# Patient Record
Sex: FEMALE
[De-identification: names, ages, dates, MRNs, and addresses within clinical notes are randomized per-mention and may not be internally consistent; named-entity substitution may affect disease eponyms.]

---

## 2022-07-15 PROBLEM — Z00.00 ENCOUNTER FOR PREVENTIVE HEALTH EXAMINATION: Status: ACTIVE | Noted: 2022-07-15

## 2022-07-21 DIAGNOSIS — Z63.4 DISAPPEARANCE AND DEATH OF FAMILY MEMBER: ICD-10-CM

## 2022-07-21 DIAGNOSIS — Z78.9 OTHER SPECIFIED HEALTH STATUS: ICD-10-CM

## 2022-07-21 DIAGNOSIS — D49.89 NEOPLASM OF UNSPECIFIED BEHAVIOR OF OTHER SPECIFIED SITES: ICD-10-CM

## 2022-07-21 DIAGNOSIS — N20.0 CALCULUS OF KIDNEY: ICD-10-CM

## 2022-07-21 DIAGNOSIS — Z82.49 FAMILY HISTORY OF ISCHEMIC HEART DISEASE AND OTHER DISEASES OF THE CIRCULATORY SYSTEM: ICD-10-CM

## 2022-07-21 SDOH — SOCIAL STABILITY - SOCIAL INSECURITY: DISSAPEARANCE AND DEATH OF FAMILY MEMBER: Z63.4

## 2022-07-22 ENCOUNTER — APPOINTMENT (OUTPATIENT)
Dept: CARDIOLOGY | Facility: CLINIC | Age: 87
End: 2022-07-22

## 2022-07-22 ENCOUNTER — NON-APPOINTMENT (OUTPATIENT)
Age: 87
End: 2022-07-22

## 2022-07-22 VITALS
RESPIRATION RATE: 14 BRPM | SYSTOLIC BLOOD PRESSURE: 124 MMHG | WEIGHT: 126 LBS | OXYGEN SATURATION: 98 % | DIASTOLIC BLOOD PRESSURE: 71 MMHG | HEIGHT: 63 IN | HEART RATE: 80 BPM | BODY MASS INDEX: 22.32 KG/M2

## 2022-07-22 DIAGNOSIS — I44.7 LEFT BUNDLE-BRANCH BLOCK, UNSPECIFIED: ICD-10-CM

## 2022-07-22 DIAGNOSIS — R60.0 LOCALIZED EDEMA: ICD-10-CM

## 2022-07-22 DIAGNOSIS — M19.90 UNSPECIFIED OSTEOARTHRITIS, UNSPECIFIED SITE: ICD-10-CM

## 2022-07-22 DIAGNOSIS — K27.9 PEPTIC ULCER, SITE UNSPECIFIED, UNSPECIFIED AS ACUTE OR CHRONIC, W/OUT HEMORRHAGE OR PERFORATION: ICD-10-CM

## 2022-07-22 DIAGNOSIS — I10 ESSENTIAL (PRIMARY) HYPERTENSION: ICD-10-CM

## 2022-07-22 DIAGNOSIS — E78.5 HYPERLIPIDEMIA, UNSPECIFIED: ICD-10-CM

## 2022-07-22 DIAGNOSIS — E03.9 HYPOTHYROIDISM, UNSPECIFIED: ICD-10-CM

## 2022-07-22 DIAGNOSIS — M54.50 LOW BACK PAIN, UNSPECIFIED: ICD-10-CM

## 2022-07-22 DIAGNOSIS — I11.9 HYPERTENSIVE HEART DISEASE W/OUT HEART FAILURE: ICD-10-CM

## 2022-07-22 PROCEDURE — 99204 OFFICE O/P NEW MOD 45 MIN: CPT | Mod: 25

## 2022-07-22 PROCEDURE — 93000 ELECTROCARDIOGRAM COMPLETE: CPT

## 2022-07-22 RX ORDER — PANTOPRAZOLE 40 MG/1
40 TABLET, DELAYED RELEASE ORAL
Qty: 90 | Refills: 1 | Status: ACTIVE | COMMUNITY
Start: 1900-01-01 | End: 1900-01-01

## 2022-07-22 RX ORDER — PNV NO.95/FERROUS FUM/FOLIC AC 28MG-0.8MG
TABLET ORAL DAILY
Refills: 0 | Status: ACTIVE | COMMUNITY

## 2022-07-22 RX ORDER — TRAMADOL HYDROCHLORIDE 50 MG/1
50 TABLET, COATED ORAL
Qty: 60 | Refills: 1 | Status: ACTIVE | COMMUNITY
Start: 1900-01-01 | End: 1900-01-01

## 2022-07-22 RX ORDER — CHOLECALCIFEROL (VITAMIN D3) 1250 MCG
1.25 MG CAPSULE ORAL
Qty: 8 | Refills: 0 | Status: ACTIVE | COMMUNITY

## 2022-07-22 RX ORDER — ASCORBIC ACID 500 MG
500 TABLET ORAL DAILY
Refills: 0 | Status: ACTIVE | COMMUNITY

## 2022-07-22 RX ORDER — LEVOTHYROXINE SODIUM 88 UG/1
88 TABLET ORAL DAILY
Refills: 0 | Status: ACTIVE | COMMUNITY

## 2022-07-22 RX ORDER — LOSARTAN POTASSIUM 100 MG/1
100 TABLET, FILM COATED ORAL DAILY
Qty: 90 | Refills: 3 | Status: ACTIVE | COMMUNITY

## 2022-07-22 NOTE — REVIEW OF SYSTEMS
[Lower Ext Edema] : lower extremity edema [Joint Pain] : joint pain [Joint Swelling] : joint swelling [Chills] : no chills [Blurry Vision] : no blurred vision [Sore Throat] : no sore throat [SOB] : no shortness of breath [Chest Discomfort] : no chest discomfort [Orthopnea] : no orthopnea [Cough] : no cough [Abdominal Pain] : no abdominal pain [Dysuria] : no dysuria [Rash] : no rash [Dizziness] : no dizziness [Confusion] : no confusion was observed [Easy Bleeding] : no tendency for easy bleeding [de-identified] : mild forgetfulness

## 2022-07-22 NOTE — DISCUSSION/SUMMARY
[FreeTextEntry1] : Patient with above hx \par \par \par bilateral lower extremity edema : possibly due to dependent venous stasis edema vs Norvasc related , doubt CHF , encourage patient to decrease salt intake , elevate feet , pressure stockings , will give low dose lasix for 2 days and observe , will obtain echocardiogram to assess ventricular function ,  blood work \par \par HTN Hypertensive heart disease :  controlled , continue  Losartan HCTZ  norvasc , will obtain echo \par \par LBBB : no prior ekg  without chest pain : will obtain her prior medical records from [facility] [id number]  phone \par \par Hypothyroidism  : will obtain her blood work  continue current dose .\par \par Chronic back pain : on tramadol, given prescription \par \par \par follow up after 6 weeks

## 2022-07-22 NOTE — PHYSICAL EXAM
[Well Developed] : well developed [Normal Conjunctiva] : normal conjunctiva [Normal Venous Pressure] : normal venous pressure [Normal S1, S2] : normal S1, S2 [No Murmur] : no murmur [Clear Lung Fields] : clear lung fields [Good Air Entry] : good air entry [No Respiratory Distress] : no respiratory distress  [Soft] : abdomen soft [Non Tender] : non-tender [Normal Bowel Sounds] : normal bowel sounds [Abnormal Gait] : abnormal gait [Normal Radial B/L] : normal radial B/L [Edema ___] : edema [unfilled] [Memory Deficit] : memory deficit [Alert and Oriented] : alert and oriented [de-identified] : kyphosis

## 2022-07-22 NOTE — HISTORY OF PRESENT ILLNESS
[FreeTextEntry1] : 89 year old female with Hx of hypertension , hypothyroid , chronic back pain , who came for cardiac evaluation as she moved from [place]  living with her niece , patient does have chronic lower extremity edema for many months , sits most of the time , felt like worsening for last one year , as she is less active , the swelling is less in the morning when she gets out of bed , gets worse by evening , denies any shortness of breath , denies any chest pain .\par \par Patient blood pressure is controlled , patient can not walk much due to back pain ,  patient did have hx of peptic ulcer disease taking Protonix ,  \par \par

## 2022-08-15 ENCOUNTER — RX CHANGE (OUTPATIENT)
Age: 87
End: 2022-08-15

## 2022-08-21 ENCOUNTER — RX RENEWAL (OUTPATIENT)
Age: 87
End: 2022-08-21

## 2022-09-29 ENCOUNTER — RX RENEWAL (OUTPATIENT)
Age: 87
End: 2022-09-29

## 2022-10-12 NOTE — DISCUSSION/SUMMARY
[FreeTextEntry1] : Patient with above hx \par \par \par bilateral lower extremity edema : possibly due to dependent venous stasis edema vs Norvasc related , doubt CHF , encourage patient to decrease salt intake , elevate feet , pressure stockings , will give low dose lasix for 2 days and observe , will obtain echocardiogram to assess ventricular function ,  blood work \par \par HTN Hypertensive heart disease :  controlled , continue  Losartan HCTZ  norvasc , will obtain echo \par \par LBBB : no prior ekg  without chest pain : will obtain her prior medical records from [facility] [id number]  phone \par \par Hypothyroidism  : will obtain her blood work  continue current dose .\par \par Chronic back pain : on tramadol, given prescription \par \par \par follow up after 6 weeks

## 2022-10-12 NOTE — REVIEW OF SYSTEMS
[Chills] : no chills [Blurry Vision] : no blurred vision [Sore Throat] : no sore throat [SOB] : no shortness of breath [Chest Discomfort] : no chest discomfort [Lower Ext Edema] : lower extremity edema [Orthopnea] : no orthopnea [Cough] : no cough [Abdominal Pain] : no abdominal pain [Dysuria] : no dysuria [Joint Pain] : joint pain [Joint Swelling] : joint swelling [Rash] : no rash [Dizziness] : no dizziness [Confusion] : no confusion was observed [Easy Bleeding] : no tendency for easy bleeding [de-identified] : mild forgetfulness

## 2022-10-12 NOTE — PHYSICAL EXAM
[Well Developed] : well developed [Normal Conjunctiva] : normal conjunctiva [Normal Venous Pressure] : normal venous pressure [Normal S1, S2] : normal S1, S2 [No Murmur] : no murmur [Clear Lung Fields] : clear lung fields [Good Air Entry] : good air entry [No Respiratory Distress] : no respiratory distress  [Soft] : abdomen soft [Non Tender] : non-tender [Normal Bowel Sounds] : normal bowel sounds [Abnormal Gait] : abnormal gait [Normal Radial B/L] : normal radial B/L [Edema ___] : edema [unfilled] [Memory Deficit] : memory deficit [Alert and Oriented] : alert and oriented [de-identified] : kyphosis

## 2022-10-14 ENCOUNTER — APPOINTMENT (OUTPATIENT)
Dept: CARDIOLOGY | Facility: CLINIC | Age: 87
End: 2022-10-14

## 2023-01-18 RX ORDER — HYDROCHLOROTHIAZIDE 12.5 MG/1
12.5 CAPSULE ORAL
Qty: 90 | Refills: 0 | Status: ACTIVE | COMMUNITY
Start: 1900-01-01 | End: 1900-01-01

## 2023-02-07 ENCOUNTER — RX RENEWAL (OUTPATIENT)
Age: 88
End: 2023-02-07

## 2023-04-09 ENCOUNTER — INPATIENT (INPATIENT)
Facility: HOSPITAL | Age: 88
LOS: 4 days | Discharge: HOME | DRG: 690 | End: 2023-04-14
Attending: STUDENT IN AN ORGANIZED HEALTH CARE EDUCATION/TRAINING PROGRAM | Admitting: STUDENT IN AN ORGANIZED HEALTH CARE EDUCATION/TRAINING PROGRAM
Payer: MEDICARE

## 2023-04-09 VITALS
OXYGEN SATURATION: 95 % | HEART RATE: 76 BPM | SYSTOLIC BLOOD PRESSURE: 111 MMHG | DIASTOLIC BLOOD PRESSURE: 66 MMHG | HEIGHT: 61 IN | RESPIRATION RATE: 22 BRPM | WEIGHT: 117.95 LBS | TEMPERATURE: 98 F

## 2023-04-09 DIAGNOSIS — N39.0 URINARY TRACT INFECTION, SITE NOT SPECIFIED: ICD-10-CM

## 2023-04-09 LAB
ALBUMIN SERPL ELPH-MCNC: 2.9 G/DL — LOW (ref 3.3–5)
ALP SERPL-CCNC: 74 U/L — SIGNIFICANT CHANGE UP (ref 30–120)
ALT FLD-CCNC: 16 U/L DA — SIGNIFICANT CHANGE UP (ref 10–60)
ANION GAP SERPL CALC-SCNC: 9 MMOL/L — SIGNIFICANT CHANGE UP (ref 5–17)
APPEARANCE UR: ABNORMAL
AST SERPL-CCNC: 26 U/L — SIGNIFICANT CHANGE UP (ref 10–40)
BACTERIA # UR AUTO: ABNORMAL
BASOPHILS # BLD AUTO: 0.04 K/UL — SIGNIFICANT CHANGE UP (ref 0–0.2)
BASOPHILS NFR BLD AUTO: 0.4 % — SIGNIFICANT CHANGE UP (ref 0–2)
BILIRUB SERPL-MCNC: 0.3 MG/DL — SIGNIFICANT CHANGE UP (ref 0.2–1.2)
BILIRUB UR-MCNC: NEGATIVE — SIGNIFICANT CHANGE UP
BUN SERPL-MCNC: 26 MG/DL — HIGH (ref 7–23)
CALCIUM SERPL-MCNC: 8.7 MG/DL — SIGNIFICANT CHANGE UP (ref 8.4–10.5)
CHLORIDE SERPL-SCNC: 96 MMOL/L — SIGNIFICANT CHANGE UP (ref 96–108)
CK SERPL-CCNC: 214 U/L — HIGH (ref 26–192)
CO2 SERPL-SCNC: 29 MMOL/L — SIGNIFICANT CHANGE UP (ref 22–31)
COLOR SPEC: YELLOW — SIGNIFICANT CHANGE UP
CREAT SERPL-MCNC: 2.02 MG/DL — HIGH (ref 0.5–1.3)
DIFF PNL FLD: ABNORMAL
EGFR: 22 ML/MIN/1.73M2 — LOW
EOSINOPHIL # BLD AUTO: 0.03 K/UL — SIGNIFICANT CHANGE UP (ref 0–0.5)
EOSINOPHIL NFR BLD AUTO: 0.3 % — SIGNIFICANT CHANGE UP (ref 0–6)
EPI CELLS # UR: SIGNIFICANT CHANGE UP
ERYTHROCYTE [SEDIMENTATION RATE] IN BLOOD: 69 MM/HR — HIGH (ref 0–20)
GLUCOSE SERPL-MCNC: 129 MG/DL — HIGH (ref 70–99)
GLUCOSE UR QL: NEGATIVE MG/DL — SIGNIFICANT CHANGE UP
HCT VFR BLD CALC: 32.3 % — LOW (ref 34.5–45)
HGB BLD-MCNC: 10.5 G/DL — LOW (ref 11.5–15.5)
IMM GRANULOCYTES NFR BLD AUTO: 0.4 % — SIGNIFICANT CHANGE UP (ref 0–0.9)
KETONES UR-MCNC: NEGATIVE — SIGNIFICANT CHANGE UP
LEUKOCYTE ESTERASE UR-ACNC: ABNORMAL
LYMPHOCYTES # BLD AUTO: 1.1 K/UL — SIGNIFICANT CHANGE UP (ref 1–3.3)
LYMPHOCYTES # BLD AUTO: 11.9 % — LOW (ref 13–44)
MCHC RBC-ENTMCNC: 26.9 PG — LOW (ref 27–34)
MCHC RBC-ENTMCNC: 32.5 GM/DL — SIGNIFICANT CHANGE UP (ref 32–36)
MCV RBC AUTO: 82.6 FL — SIGNIFICANT CHANGE UP (ref 80–100)
MONOCYTES # BLD AUTO: 0.73 K/UL — SIGNIFICANT CHANGE UP (ref 0–0.9)
MONOCYTES NFR BLD AUTO: 7.9 % — SIGNIFICANT CHANGE UP (ref 2–14)
NEUTROPHILS # BLD AUTO: 7.3 K/UL — SIGNIFICANT CHANGE UP (ref 1.8–7.4)
NEUTROPHILS NFR BLD AUTO: 79.1 % — HIGH (ref 43–77)
NITRITE UR-MCNC: POSITIVE
NRBC # BLD: 0 /100 WBCS — SIGNIFICANT CHANGE UP (ref 0–0)
PH UR: 7 — SIGNIFICANT CHANGE UP (ref 5–8)
PLATELET # BLD AUTO: 443 K/UL — HIGH (ref 150–400)
POTASSIUM SERPL-MCNC: 3.4 MMOL/L — LOW (ref 3.5–5.3)
POTASSIUM SERPL-SCNC: 3.4 MMOL/L — LOW (ref 3.5–5.3)
PROT SERPL-MCNC: 7.4 G/DL — SIGNIFICANT CHANGE UP (ref 6–8.3)
PROT UR-MCNC: 30 MG/DL
RBC # BLD: 3.91 M/UL — SIGNIFICANT CHANGE UP (ref 3.8–5.2)
RBC # FLD: 15.9 % — HIGH (ref 10.3–14.5)
RBC CASTS # UR COMP ASSIST: SIGNIFICANT CHANGE UP /HPF (ref 0–4)
SARS-COV-2 RNA SPEC QL NAA+PROBE: SIGNIFICANT CHANGE UP
SODIUM SERPL-SCNC: 134 MMOL/L — LOW (ref 135–145)
SP GR SPEC: 1.01 — SIGNIFICANT CHANGE UP (ref 1.01–1.02)
TROPONIN I, HIGH SENSITIVITY RESULT: 10.4 NG/L — SIGNIFICANT CHANGE UP
UROBILINOGEN FLD QL: NEGATIVE MG/DL — SIGNIFICANT CHANGE UP
WBC # BLD: 9.24 K/UL — SIGNIFICANT CHANGE UP (ref 3.8–10.5)
WBC # FLD AUTO: 9.24 K/UL — SIGNIFICANT CHANGE UP (ref 3.8–10.5)
WBC UR QL: >50

## 2023-04-09 PROCEDURE — 70450 CT HEAD/BRAIN W/O DYE: CPT | Mod: 26,MA

## 2023-04-09 PROCEDURE — 73562 X-RAY EXAM OF KNEE 3: CPT | Mod: 26,LT

## 2023-04-09 PROCEDURE — 72170 X-RAY EXAM OF PELVIS: CPT | Mod: 26

## 2023-04-09 PROCEDURE — 99285 EMERGENCY DEPT VISIT HI MDM: CPT

## 2023-04-09 PROCEDURE — 71045 X-RAY EXAM CHEST 1 VIEW: CPT | Mod: 26

## 2023-04-09 PROCEDURE — 93010 ELECTROCARDIOGRAM REPORT: CPT

## 2023-04-09 PROCEDURE — 99222 1ST HOSP IP/OBS MODERATE 55: CPT

## 2023-04-09 PROCEDURE — 72100 X-RAY EXAM L-S SPINE 2/3 VWS: CPT | Mod: 26

## 2023-04-09 PROCEDURE — 73110 X-RAY EXAM OF WRIST: CPT | Mod: 26,LT

## 2023-04-09 PROCEDURE — 72070 X-RAY EXAM THORAC SPINE 2VWS: CPT | Mod: 26

## 2023-04-09 RX ORDER — SODIUM CHLORIDE 9 MG/ML
1000 INJECTION INTRAMUSCULAR; INTRAVENOUS; SUBCUTANEOUS
Refills: 0 | Status: DISCONTINUED | OUTPATIENT
Start: 2023-04-09 | End: 2023-04-09

## 2023-04-09 RX ORDER — LANOLIN ALCOHOL/MO/W.PET/CERES
3 CREAM (GRAM) TOPICAL AT BEDTIME
Refills: 0 | Status: DISCONTINUED | OUTPATIENT
Start: 2023-04-09 | End: 2023-04-14

## 2023-04-09 RX ORDER — SODIUM CHLORIDE 9 MG/ML
500 INJECTION INTRAMUSCULAR; INTRAVENOUS; SUBCUTANEOUS ONCE
Refills: 0 | Status: COMPLETED | OUTPATIENT
Start: 2023-04-09 | End: 2023-04-09

## 2023-04-09 RX ORDER — CEFTRIAXONE 500 MG/1
1000 INJECTION, POWDER, FOR SOLUTION INTRAMUSCULAR; INTRAVENOUS ONCE
Refills: 0 | Status: COMPLETED | OUTPATIENT
Start: 2023-04-09 | End: 2023-04-09

## 2023-04-09 RX ORDER — SODIUM CHLORIDE 9 MG/ML
1000 INJECTION INTRAMUSCULAR; INTRAVENOUS; SUBCUTANEOUS
Refills: 0 | Status: DISCONTINUED | OUTPATIENT
Start: 2023-04-09 | End: 2023-04-11

## 2023-04-09 RX ORDER — CEFTRIAXONE 500 MG/1
1000 INJECTION, POWDER, FOR SOLUTION INTRAMUSCULAR; INTRAVENOUS EVERY 24 HOURS
Refills: 0 | Status: DISCONTINUED | OUTPATIENT
Start: 2023-04-10 | End: 2023-04-12

## 2023-04-09 RX ORDER — ENOXAPARIN SODIUM 100 MG/ML
30 INJECTION SUBCUTANEOUS EVERY 24 HOURS
Refills: 0 | Status: DISCONTINUED | OUTPATIENT
Start: 2023-04-09 | End: 2023-04-14

## 2023-04-09 RX ORDER — ACETAMINOPHEN 500 MG
650 TABLET ORAL EVERY 6 HOURS
Refills: 0 | Status: DISCONTINUED | OUTPATIENT
Start: 2023-04-09 | End: 2023-04-14

## 2023-04-09 RX ADMIN — ENOXAPARIN SODIUM 30 MILLIGRAM(S): 100 INJECTION SUBCUTANEOUS at 11:52

## 2023-04-09 RX ADMIN — SODIUM CHLORIDE 75 MILLILITER(S): 9 INJECTION INTRAMUSCULAR; INTRAVENOUS; SUBCUTANEOUS at 11:52

## 2023-04-09 RX ADMIN — CEFTRIAXONE 100 MILLIGRAM(S): 500 INJECTION, POWDER, FOR SOLUTION INTRAMUSCULAR; INTRAVENOUS at 09:22

## 2023-04-09 RX ADMIN — SODIUM CHLORIDE 500 MILLILITER(S): 9 INJECTION INTRAMUSCULAR; INTRAVENOUS; SUBCUTANEOUS at 10:22

## 2023-04-09 RX ADMIN — SODIUM CHLORIDE 500 MILLILITER(S): 9 INJECTION INTRAMUSCULAR; INTRAVENOUS; SUBCUTANEOUS at 09:22

## 2023-04-09 RX ADMIN — CEFTRIAXONE 1000 MILLIGRAM(S): 500 INJECTION, POWDER, FOR SOLUTION INTRAMUSCULAR; INTRAVENOUS at 10:00

## 2023-04-09 NOTE — CONSULT NOTE ADULT - ASSESSMENT
89y old  Female with h/o HTN presents from assisted living Came to ED s/p Fall.  No C/o Lightheadedness Vertigo.  C/o left wrist pain and also left hip area pain.   Left wrist splint in place.  Multiple X rays are done - Reports to follow.  CT Head - No acute pathology.  No signs of CVA.  Cr - 2.02  No signs of CVA.  F/up x rays reports.  Ortho eval if any Fx  Pain meds.  PT when cleared by Ortho  Fall/safety precautions.   D/w Dr. Godinez  Would continue to follow.

## 2023-04-09 NOTE — CONSULT NOTE ADULT - ASSESSMENT
The patient is a 89 year old female with a history of HTN, dementia who presents with a fall.    Plan:  - ECG with LBBB; no prior for comparison  - Cardiac enzymes negative  - Elevated creatinine - no prior for comparison  - Continue IV fluids  - Hold furosemide  - Hold amlodipine - BP on low side  - IV antibiotics for possible UTI

## 2023-04-09 NOTE — CONSULT NOTE ADULT - SUBJECTIVE AND OBJECTIVE BOX
History of Present Illness:    Past Medical/Surgical History:    Medications:    Family History: Non-contributory family history of premature cardiovascular atherosclerotic disease    Social History: No tobacco, alcohol or drug use    Review of Systems:  General: No fevers, chills, weight gain  Skin: No rashes, color changes  Cardiovascular: No chest pain, orthopnea  Respiratory: No shortness of breath, cough  Gastrointestinal: No nausea, abdominal pain  Genitourinary: No incontinence, pain with urination  Musculoskeletal: No pain, swelling, decreased range of motion  Neurological: No headache, weakness  Psychiatric: No depression, anxiety  Endocrine: No weight gain, increased thirst  All other systems are comprehensively negative.    Physical Exam:  Vitals:        Vital Signs Last 24 Hrs  T(C): 36.1 (09 Apr 2023 07:41), Max: 36.4 (09 Apr 2023 06:35)  T(F): 97 (09 Apr 2023 07:41), Max: 97.5 (09 Apr 2023 06:35)  HR: 77 (09 Apr 2023 07:41) (76 - 77)  BP: 101/60 (09 Apr 2023 07:41) (101/60 - 111/66)  BP(mean): --  RR: 18 (09 Apr 2023 07:41) (18 - 22)  SpO2: 95% (09 Apr 2023 07:41) (95% - 95%)    Parameters below as of 09 Apr 2023 07:41  Patient On (Oxygen Delivery Method): room air      General: NAD  HEENT: MMM  Neck: No JVD, no carotid bruit  Lungs: CTAB  CV: RRR, nl S1/S2, no M/R/G  Abdomen: S/NT/ND, +BS  Extremities: No LE edema, no cyanosis  Neuro: AAOx3, non-focal  Skin: No rash    Labs:                        10.5   9.24  )-----------( 443      ( 09 Apr 2023 07:34 )             32.3     04-09    134<L>  |  96  |  26<H>  ----------------------------<  129<H>  3.4<L>   |  29  |  2.02<H>    Ca    8.7      09 Apr 2023 07:34    TPro  7.4  /  Alb  2.9<L>  /  TBili  0.3  /  DBili  x   /  AST  26  /  ALT  16  /  AlkPhos  74  04-09    CARDIAC MARKERS ( 09 Apr 2023 07:38 )  x     / x     / 214 U/L / x     / x              ECG/Telemetry: NSR, LBBB     History of Present Illness: The patient is a 89 year old female with a history of HTN, dementia who presents with a fall. The patient is a poor historian. She notes left arm pain but unable to elaborate on the fall. No chest pain, shortness of breath. She reportedly was feeling dizzy previously.    Past Medical/Surgical History:  HTN, dementia    Medications:  Home Medications:  amLODIPine 5 mg oral tablet: 1 orally 2 times a day (09 Apr 2023 09:25)  Colace 100 mg oral capsule: 1 orally once a day (09 Apr 2023 09:25)  furosemide 40 mg oral tablet: 1 orally once a day (09 Apr 2023 09:25)  Vitamin D3 50 mcg (2000 intl units) oral tablet: 1 orally once a day (09 Apr 2023 09:25)      Family History: Non-contributory family history of premature cardiovascular atherosclerotic disease    Social History: No tobacco, alcohol or drug use    Review of Systems:  General: No fevers, chills, weight gain  Skin: No rashes, color changes  Cardiovascular: No chest pain, orthopnea  Respiratory: No shortness of breath, cough  Gastrointestinal: No nausea, abdominal pain  Genitourinary: No incontinence, pain with urination  Musculoskeletal: No pain, swelling, decreased range of motion  Neurological: No headache, weakness  Psychiatric: No depression, anxiety  Endocrine: No weight gain, increased thirst  All other systems are comprehensively negative.    Physical Exam:  Vitals:        Vital Signs Last 24 Hrs  T(C): 36.1 (09 Apr 2023 07:41), Max: 36.4 (09 Apr 2023 06:35)  T(F): 97 (09 Apr 2023 07:41), Max: 97.5 (09 Apr 2023 06:35)  HR: 77 (09 Apr 2023 07:41) (76 - 77)  BP: 101/60 (09 Apr 2023 07:41) (101/60 - 111/66)  BP(mean): --  RR: 18 (09 Apr 2023 07:41) (18 - 22)  SpO2: 95% (09 Apr 2023 07:41) (95% - 95%)  Parameters below as of 09 Apr 2023 07:41  Patient On (Oxygen Delivery Method): room air  General: NAD  HEENT: MMM  Neck: No JVD, no carotid bruit  Lungs: CTAB  CV: RRR, nl S1/S2, no M/R/G  Abdomen: S/NT/ND, +BS  Extremities: No LE edema, no cyanosis  Neuro: AAOx3, non-focal  Skin: No rash    Labs:                        10.5   9.24  )-----------( 443      ( 09 Apr 2023 07:34 )             32.3     04-09    134<L>  |  96  |  26<H>  ----------------------------<  129<H>  3.4<L>   |  29  |  2.02<H>    Ca    8.7      09 Apr 2023 07:34    TPro  7.4  /  Alb  2.9<L>  /  TBili  0.3  /  DBili  x   /  AST  26  /  ALT  16  /  AlkPhos  74  04-09    CARDIAC MARKERS ( 09 Apr 2023 07:38 )  x     / x     / 214 U/L / x     / x              ECG/Telemetry: NSR, LBBB

## 2023-04-09 NOTE — CONSULT NOTE ADULT - SUBJECTIVE AND OBJECTIVE BOX
Patient is a 89y old  Female who presents with a chief complaint of Dizziness  Fall (2023 10:51)    HPI:  89 yo F with PMH HTN, OA presenting from [facility] with a fall. Patient is a limited historian due to mental status, collateral from chart review. Called patients niece but was not able to provide additional info. Patient BIBEMS from [facility] for fall. Patient not sure how she fell but currently complains of pain in her left wrist and knee. She currently denies any other sx. Workup in the ED suggestive of UTI. X-rays of wrist, knees, and spine pending.   (2023 10:52)      PAST MEDICAL HISTORY:  HTN (hypertension)        PAST SURGICAL HISTORY:  No significant past surgical history        FAMILY HISTORY:  No pertinent family history in first degree relatives        SOCIAL HISTORY:    Allergies    No Known Allergies    Intolerances      Home Medications:  amLODIPine 5 mg oral tablet: 1 orally 2 times a day (2023 09:25)  Colace 100 mg oral capsule: 1 orally once a day (2023 09:25)  furosemide 40 mg oral tablet: 1 orally once a day (2023 09:25)  Vitamin D3 50 mcg (2000 intl units) oral tablet: 1 orally once a day (2023 09:25)    MEDICATIONS  (STANDING):  enoxaparin Injectable 30 milliGRAM(s) SubCutaneous every 24 hours  sodium chloride 0.9%. 1000 milliLiter(s) (50 mL/Hr) IV Continuous <Continuous>    MEDICATIONS  (PRN):  acetaminophen     Tablet .. 650 milliGRAM(s) Oral every 6 hours PRN Temp greater or equal to 38C (100.4F), Mild Pain (1 - 3)  melatonin 3 milliGRAM(s) Oral at bedtime PRN Insomnia      REVIEW OF SYSTEMS:  General:   Respiratory: No cough, SOB  Cardiovascular: No CP or Palpitations	  Gastrointestinal: No nausea, Vomiting. No diarrhea  Genitourinary: No urinary complaints	  Musculoskeletal: No leg swelling, No new rash or lesions	  Neurological: 	  all other systems negative    T(F): 97 (23 @ 07:41), Max: 97.5 (23 @ 06:35)  HR: 77 (23 @ 07:41) (76 - 77)  BP: 101/60 (23 @ 07:41) (101/60 - 111/66)  RR: 18 (23 @ 07:41) (18 - 22)  SpO2: 95% (23 @ 07:41) (95% - 95%)  Wt(kg): --    PHYSICAL EXAM:  General: NAD  Respiratory: b/l air entry  Cardiovascular: S1 S2  Gastrointestinal: soft  Extremities: edema            134<L>  |  96  |  26<H>  ----------------------------<  129<H>  3.4<L>   |  29  |  2.02<H>    Ca    8.7      2023 07:34    TPro  7.4  /  Alb  2.9<L>  /  TBili  0.3  /  DBili  x   /  AST  26  /  ALT  16  /  AlkPhos  74                            10.5   9.24  )-----------( 443      ( 2023 07:34 )             32.3       Hematocrit: 32.3 % ( @ 07:34)  Hemoglobin: 10.5 g/dL ( @ 07:34)  Calcium, Total Serum: 8.7 mg/dL ( @ 07:34)  Potassium, Serum: 3.4 mmol/L ( @ 07:34)      Creatinine, Serum: 2.02 ( @ 07:34)      Urinalysis Basic - ( 2023 07:34 )    Color: Yellow / Appearance: Slightly Turbid / S.010 / pH: x  Gluc: x / Ketone: Negative  / Bili: Negative / Urobili: Negative mg/dL   Blood: x / Protein: 30 mg/dL / Nitrite: Positive   Leuk Esterase: Moderate / RBC: 0-2 /HPF / WBC >50   Sq Epi: x / Non Sq Epi: x / Bacteria: Moderate      LIVER FUNCTIONS - ( 2023 07:34 )  Alb: 2.9 g/dL / Pro: 7.4 g/dL / ALK PHOS: 74 U/L / ALT: 16 U/L DA / AST: 26 U/L / GGT: x           CARDIAC MARKERS ( 2023 07:38 )  x     / x     / 214 U/L / x     / x          Creatine Kinase, Serum: 214 U/L (23 @ 07:38)          I&O's Detail           Patient is a 89y old  Female who presents with a chief complaint of Dizziness  Fall (2023 10:51)    HPI:  89 yo F with PMH HTN, OA presenting from [facility] with a fall. Patient is a limited historian due to mental status, collateral from chart review. Called patients niece but was not able to provide additional info. Patient BIBEMS from [facility] for fall. Patient not sure how she fell but currently complains of pain in her left wrist and knee. She currently denies any other sx. Workup in the ED suggestive of UTI. X-rays of wrist, knees, and spine pending.   (2023 10:52)    Renal consult called for SAM. History obtained from chart.       PAST MEDICAL HISTORY:  HTN (hypertension)        PAST SURGICAL HISTORY:  No significant past surgical history        FAMILY HISTORY:  No pertinent family history in first degree relatives        SOCIAL HISTORY: No smoking or alcohol use     Allergies    No Known Allergies    Intolerances      Home Medications:  amLODIPine 5 mg oral tablet: 1 orally 2 times a day (2023 09:25)  Colace 100 mg oral capsule: 1 orally once a day (2023 09:25)  furosemide 40 mg oral tablet: 1 orally once a day (2023 09:25)  Vitamin D3 50 mcg (2000 intl units) oral tablet: 1 orally once a day (2023 09:25)    MEDICATIONS  (STANDING):  enoxaparin Injectable 30 milliGRAM(s) SubCutaneous every 24 hours  sodium chloride 0.9%. 1000 milliLiter(s) (50 mL/Hr) IV Continuous <Continuous>    MEDICATIONS  (PRN):  acetaminophen     Tablet .. 650 milliGRAM(s) Oral every 6 hours PRN Temp greater or equal to 38C (100.4F), Mild Pain (1 - 3)  melatonin 3 milliGRAM(s) Oral at bedtime PRN Insomnia      REVIEW OF SYSTEMS:  General: no distress  Respiratory: No cough, SOB  Cardiovascular: No CP or Palpitations	  Gastrointestinal: No nausea, Vomiting. No diarrhea  Genitourinary: No urinary complaints	  Musculoskeletal: No leg swelling, No new rash or lesions		  all other systems negative    T(F): 97 (23 @ 07:41), Max: 97.5 (23 @ 06:35)  HR: 77 (23 @ 07:41) (76 - 77)  BP: 101/60 (23 @ 07:41) (101/60 - 111/66)  RR: 18 (23 @ 07:41) (18 - 22)  SpO2: 95% (23 @ 07:41) (95% - 95%)  Wt(kg): --    PHYSICAL EXAM:  General: NAD  Respiratory: b/l air entry  Cardiovascular: S1 S2  Gastrointestinal: soft  Extremities: no edema            134<L>  |  96  |  26<H>  ----------------------------<  129<H>  3.4<L>   |  29  |  2.02<H>    Ca    8.7      2023 07:34    TPro  7.4  /  Alb  2.9<L>  /  TBili  0.3  /  DBili  x   /  AST  26  /  ALT  16  /  AlkPhos  74                            10.5   9.24  )-----------( 443      ( 2023 07:34 )             32.3       Hematocrit: 32.3 % ( @ 07:34)  Hemoglobin: 10.5 g/dL ( @ 07:34)  Calcium, Total Serum: 8.7 mg/dL ( @ 07:34)  Potassium, Serum: 3.4 mmol/L ( @ 07:34)      Creatinine, Serum: 2.02 ( @ 07:34)      Urinalysis Basic - ( 2023 07:34 )    Color: Yellow / Appearance: Slightly Turbid / S.010 / pH: x  Gluc: x / Ketone: Negative  / Bili: Negative / Urobili: Negative mg/dL   Blood: x / Protein: 30 mg/dL / Nitrite: Positive   Leuk Esterase: Moderate / RBC: 0-2 /HPF / WBC >50   Sq Epi: x / Non Sq Epi: x / Bacteria: Moderate      LIVER FUNCTIONS - ( 2023 07:34 )  Alb: 2.9 g/dL / Pro: 7.4 g/dL / ALK PHOS: 74 U/L / ALT: 16 U/L DA / AST: 26 U/L / GGT: x           CARDIAC MARKERS ( 2023 07:38 )  x     / x     / 214 U/L / x     / x          Creatine Kinase, Serum: 214 U/L (23 @ 07:38)

## 2023-04-09 NOTE — H&P ADULT - ASSESSMENT
89 yo F with PMH HTN, OA presenting from [facility] with a fall.      #UTI  Admit to medicine  Rocephine  ID eval   Follow cultures    #Fall  Follow X-rays ordered by ED  Fall precautions  CT head neg  PT eval    #SAM  Baseline unknown  Likely due to dehydration  Gentle fluids  Hold Lasix    #HTN  BP running lower  Restart Amlodipine if BP improves    #GOC  Spoke with patients niece, HCP about GOC  Has never thought about it  Palliative care eval for further GOC discussion     #DVT ppx  Renally dosed Lovenox

## 2023-04-09 NOTE — H&P ADULT - HISTORY OF PRESENT ILLNESS
89 yo F with PMH HTN, OA presenting from [facility] with a fall. Patient is a limited historian due to mental status, collateral from chart review. Called patients niece but was not able to provide additional info. Patient BIBEMS from [facility] for fall. Patient not sure how she fell but currently complains of pain in her left wrist and knee. She currently denies any other sx. Workup in the ED suggestive of UTI. X-rays of wrist, knees, and spine pending.

## 2023-04-09 NOTE — CONSULT NOTE ADULT - ASSESSMENT
Patient is a 89 year old fame with PMH of HTN, OA presenting from [facility] with a fall.    Acute UTI  S/p Fall  SAM possibly due to dehdration   - UA with pyuria with WBC >50, mod LE, +nitrites, mod bacteria   - patient poor historian/confused, unable to obtain reliable gu hx, will treat   - follow urine culture    - follow renal/bladder ultrasound   - prior cultures reviewed     - continue on ceftriaxone 1g IV Q24h   - CTH negative, PT eval and work up per primary team   - monitor temps/WBC, Cr       D/w Dr. Gretchen Oh M.D.  [facility], Division of Infectious Diseases  [phone]  After 5pm on weekdays and all day on weekends - please call [phone] Patient is a 89 year old fame with PMH of HTN, OA presenting from [facility] with a fall.    Acute UTI  S/p Fall  SAM possibly due to dehydration   - UA with pyuria with WBC >50, mod LE, +nitrites, mod bacteria   - patient poor historian/confused, unable to obtain reliable gu hx, will treat   - follow urine culture    - follow renal/bladder ultrasound   - prior cultures reviewed     - continue on ceftriaxone 1g IV Q24h   - CTH negative, PT eval and work up per primary team   - monitor temps/WBC, Cr       D/w Dr. Gretchen Oh M.D.  [facility], Division of Infectious Diseases  [phone]  After 5pm on weekdays and all day on weekends - please call [phone]

## 2023-04-09 NOTE — CONSULT NOTE ADULT - ASSESSMENT
SAM: Prerenal azotemia  Hypokalemia  UTI  h/o Hypertension    IV hydration. Will get kidney and bladder sonogram. Check cultures, IV abx. Potassium supplementation.   Encourage PO intake as tolerated. Will follow electrolytes and renal function trend.     Further recommendations pending clinical course. Thank you for the courtesy of this referral.

## 2023-04-09 NOTE — ED PROVIDER NOTE - CARE PLAN
1 Principal Discharge DX:	Acute UTI  Secondary Diagnosis:	SAM (acute kidney injury)  Secondary Diagnosis:	Fall  Secondary Diagnosis:	Left wrist sprain

## 2023-04-09 NOTE — PATIENT PROFILE ADULT - FALL HARM RISK - RISK INTERVENTIONS

## 2023-04-09 NOTE — CONSULT NOTE ADULT - SUBJECTIVE AND OBJECTIVE BOX
Patient is a 89y old  Female who presents with a chief complaint of Fall.    HPI: 89y old  Female with h/o HTN presents from assisted living with complaint of pain after a fall.  Patient states she is not sure that she fell but EMS reports that they were told she fell.  Patient complains of pain in her left wrist.  Patient also complains of pain in her back which she states is longstanding but is worse today.  Patient also complains of some pain in her left knee.  No headache.  But patient states she feels generally unwell and is dizzy.  Unsure if she hit her head.  No report of fever or vomiting.  Patient denies shortness of breath.    PAST MEDICAL & SURGICAL HISTORY:    HTN (hypertension)    No significant past surgical history    Home Medications:    amLODIPine 5 mg oral tablet: 1 orally 2 times a day (2023 09:25)  Colace 100 mg oral capsule: 1 orally once a day (2023 09:25)  furosemide 40 mg oral tablet: 1 orally once a day (2023 09:25)  Vitamin D3 50 mcg (2000 intl units) oral tablet: 1 orally once a day (2023 09:25)    Allergies    No Known Allergies    SOCIAL HISTORY:    No h/o Smoking.   No h/o alcohol use.    FAMILY HISTORY: N/C as per chart    REVIEW OF SYSTEMS:    CONSTITUTIONAL: No fever  EYES: No eye pain,   ENMT:  No sinus or throat pain  NECK: No pain or stiffness  RESPIRATORY: No cough, No hemoptysis; No shortness of breath  CARDIOVASCULAR: No acute chest pain, palpitations,  or leg swelling  GASTROINTESTINAL: No abdominal pain. No nausea, vomiting, or hematemesis;  No melena or hematochezia.  GENITOURINARY: No  hematuria, or incontinence  MUSCULOSKELETAL: No joint swelling; No extremity pain  SKIN: No itching, rashes, or lesions   LYMPH NODES: No enlarged glands  NEUROLOGICAL: No headaches, memory loss,   PSYCHIATRIC: No depression, anxiety, mood swings, or difficulty sleeping  ENDOCRINE: No heat or cold intolerance;   HEME/LYMPH: No easy bruising, or bleeding gums  Allergy/Immunology. No medication allergy. No seasonal allergies.    PHYSICAL EXAM:  Vital Signs Last 24 Hrs  T(F): 97 (23 @ 07:41)  HR: 77 (23 @ 07:41)  BP: 101/60 (23 @ 07:41)  RR: 18 (23 @ 07:41)    GENERAL: NAD, well-groomed, well-developed  HEAD:  Atraumatic, Normocephalic  EYES: EOMI, PERRLA, conjunctiva and sclera clear  NECK: Supple, No JVD,     On Neurological Examination:    Mental Status - Pt is alert, awake, oriented X3. Higher functions are intact. Pt. does have mild poor cognition. Follows commands well and able to answer questions appropriately.    Speech -  Normal. Slurred. Pt has no aphasia.    Cranial Nerves - Pupils 3 mm equal and reactive to light, extraocular eye movements intact. Pt has no visual field deficit.  Pt has no right left facial asymmetry. Tongue - is in midline.    Motor Exam - 4 plus/5 all over, No drift. No shaking or tremors.  Muscle tone - is normal all over. Moves all extremities equally. No asymmetry is seen.      Sensory Exam - Pin prick, temperature, joint position and vibration are intact on either side. Pt withdraws all extremities equally on stimulation. No asymmetry seen. No complaints of tingling, numbness.    Gait - Able to stand and walk unassisted. Pt is able to stand up with holding my hands and is able to walk for few feet around the bed. Not falling to either side.    Deep tendon Reflexes - 2 plus all over.    Coordination - Fine finger movements are normal on both sides. Finger to nose is also normal on both sides.       Romberg - Negative.    Neck Supple -  Yes.    LABS:                        10.5   9.24  )-----------( 443      ( 2023 07:34 )             32.3         134<L>  |  96  |  26<H>  ----------------------------<  129<H>  3.4<L>   |  29  |  2.02<H>    Ca    8.7      2023 07:34    TPro  7.4  /  Alb  2.9<L>  /  TBili  0.3  /  DBili  x   /  AST  26  /  ALT  16  /  AlkPhos  74        Urinalysis Basic - ( 2023 07:34 )    Color: Yellow / Appearance: Slightly Turbid / S.010 / pH: x  Gluc: x / Ketone: Negative  / Bili: Negative / Urobili: Negative mg/dL   Blood: x / Protein: 30 mg/dL / Nitrite: Positive   Leuk Esterase: Moderate / RBC: 0-2 /HPF / WBC >50   Sq Epi: x / Non Sq Epi: x / Bacteria: Moderate    RADIOLOGY & ADDITIONAL STUDIES:    < from: CT Head No Cont (23 @ 07:13) >    No evidence of acute intracranial hemorrhage, midline shift or CT evidence of acute territorial infarct.    < end of copied text >     Patient is a 89y old  Female who presents with a chief complaint of Fall.    HPI: 89y old  Female with h/o HTN presents from assisted living with complaint of pain after a fall.  Patient states she is not sure that she fell but EMS reports that they were told she fell.  Patient complains of pain in her left wrist.  Patient also complains of pain in her back which she states is longstanding but is worse today.  Patient also complains of some pain in her left knee.  No headache.  But patient states she feels generally unwell and is dizzy.  Unsure if she hit her head.  No report of fever or vomiting.  Patient denies shortness of breath.  No signs of head or facial injury.  No lateralized weakness.  No seizure reported.  No tongue bite.    PAST MEDICAL & SURGICAL HISTORY:    HTN (hypertension)    No significant past surgical history    Home Medications:    amLODIPine 5 mg oral tablet: 1 orally 2 times a day (2023 09:25)  Colace 100 mg oral capsule: 1 orally once a day (2023 09:25)  furosemide 40 mg oral tablet: 1 orally once a day (2023 09:25)  Vitamin D3 50 mcg (2000 intl units) oral tablet: 1 orally once a day (2023 09:25)    Allergies    No Known Allergies    SOCIAL HISTORY:    No h/o Smoking.   No h/o alcohol use.    FAMILY HISTORY: N/C as per chart    REVIEW OF SYSTEMS: Poor cognition.    PHYSICAL EXAM:  Vital Signs Last 24 Hrs  T(F): 97 (23 @ 07:41)  HR: 77 (23 @ 07:41)  BP: 101/60 (23 @ 07:41)  RR: 18 (23 @ 07:41)    GENERAL: NAD, well-groomed, well-developed  HEAD:  Atraumatic, Normocephalic  EYES: EOMI, PERRLA, conjunctiva and sclera clear  NECK: Supple, No JVD,     On Neurological Examination:    Mental Status - Pt is alert, awake, Poor cognition. Follows commands.    Speech -  Normal. Pt has no aphasia.    Cranial Nerves - Pupils 3 mm equal and reactive to light, extraocular eye movements intact. No facial asymmetry. Tongue - is in midline.    Motor Exam - C/o left wrist pain and also left hip area pain. moves right sided extremities well.    Sensory Exam -  Pt withdraws all extremities equally on stimulation.    Gait - Couldn't be tested currently.    Deep tendon Reflexes - 2 plus all over.    Neck Supple -  Yes.    LABS:                        10.5   9.24  )-----------( 443      ( 2023 07:34 )             32.3         134<L>  |  96  |  26<H>  ----------------------------<  129<H>  3.4<L>   |  29  |  2.02<H>    Ca    8.7      2023 07:34    TPro  7.4  /  Alb  2.9<L>  /  TBili  0.3  /  DBili  x   /  AST  26  /  ALT  16  /  AlkPhos  74        Urinalysis Basic - ( 2023 07:34 )    Color: Yellow / Appearance: Slightly Turbid / S.010 / pH: x  Gluc: x / Ketone: Negative  / Bili: Negative / Urobili: Negative mg/dL   Blood: x / Protein: 30 mg/dL / Nitrite: Positive   Leuk Esterase: Moderate / RBC: 0-2 /HPF / WBC >50   Sq Epi: x / Non Sq Epi: x / Bacteria: Moderate    RADIOLOGY & ADDITIONAL STUDIES:    Multiple X rays are done - Reports to follow.    < from: CT Head No Cont (23 @ 07:13) >    No evidence of acute intracranial hemorrhage, midline shift or CT evidence of acute territorial infarct.    < end of copied text >

## 2023-04-09 NOTE — H&P ADULT - NSHPPHYSICALEXAM_GEN_ALL_CORE
Vitals:  T(C): 36.1 (04-09-23 @ 07:41), Max: 36.4 (04-09-23 @ 06:35)  HR: 77 (04-09-23 @ 07:41) (76 - 77)  BP: 101/60 (04-09-23 @ 07:41) (101/60 - 111/66)  RR: 18 (04-09-23 @ 07:41) (18 - 22)  SpO2: 95% (04-09-23 @ 07:41) (95% - 95%)    PHYSICAL EXAM:  General: Well developed, well nourished, no apparent distress  HEENT: NAD, elderly   Neck: Supple, nontender, no palpable mass  Neurology: Awake, confused, sensation intact   Respiratory: Diminished breath sounds at bases, on room air   Cardio: S1,S2, no murmurs, rubs or gallops  Abdominal: Soft, non-tender, non-distended bowel sounds present x4, no palpable masses  Extremities: No clubbing, cyanosis or edema, peripheral pulses present  MSK: Normal range of motion, no joint erythema or warmth, no joint swelling   Heme: No obvious ecchymosis or petechiae   Skin: warm, dry, normal color

## 2023-04-09 NOTE — ED PROVIDER NOTE - CLINICAL SUMMARY MEDICAL DECISION MAKING FREE TEXT BOX
Patient with pain to wrist and back after ?fall. Also noted to have left knee effusion and pain, and c/o dizziness. Will geet imaging of painful areas, head CT, check labs, and reassess Patient with pain to wrist and back after ?fall. Also noted to have left knee effusion and pain, and c/o dizziness. Will get imaging of painful areas, head CT, check labs, and reassess

## 2023-04-09 NOTE — ED ADULT TRIAGE NOTE - PATIENT ON (OXYGEN DELIVERY METHOD)
Abdomen soft, non-tender and non-distended, no rebound, no guarding and no masses. no hepatosplenomegaly.
room air

## 2023-04-09 NOTE — CONSULT NOTE ADULT - SUBJECTIVE AND OBJECTIVE BOX
[facility] DIVISION OF INFECTIOUS DISEASES  ANNA Schaefer S. Shah, Y. Patel, G. Jefferson  [phone]  ([phone] - weekdays after 5pm and weekends)    CONNIE SHARMA  89y, Female  [id number]    HPI:  Patient is a 89 year old fame with PMH of HTN, OA presenting from [facility] with a fall. Patient is a limited historian due to mental status, collateral from chart review. Called patients niece but was not able to provide additional info. Patient BIBEMS from [facility] for fall. Patient not sure how she fell but currently complains of pain in her left wrist and knee. She currently denies any other sx. Workup in the ED suggestive of UTI. X-rays of wrist, knees, and spine pending.   (2023 10:52)  Patient seen and examined at bedside this afternoon in the ER, patient confused, denies pain.   ROS: limited, pertinent positives and negatives as per HPI.    Allergies: No Known Allergies    PMH -- HTN (hypertension), OA, dementia   PSH -- No significant past surgical history  FH -- No pertinent family history in first degree relatives  Social History -- denies tobacco, alcohol or illicit drug use    Physical Exam--  Vital Signs Last 24 Hrs  T(F): 97.5 (2023 17:39), Max: 97.5 (2023 06:35)  HR: 80 (2023 17:39) (76 - 80)  BP: 133/78 (2023 17:39) (101/60 - 148/66)  RR: 18 (2023 17:39) (18 - 22)  SpO2: 92% (2023 17:39) (92% - 95%)  General: no acute distress  HEENT: NC/AT, EOMI, anicteric, neck supple  Lungs: decreased breath sounds b/l  Heart: S1, S2 present, normal rate, no murmur heard   Abdomen: Soft. Nondistended. Nontender. BS present.   Neuro: AAOx1, confused, able to follow simple commands  Extremities: No cyanosis or clubbing. No edema.   Skin: Warm. Dry. Good turgor. No visible rash.   Lines: PIV    Laboratory & Imaging Data--  CBC:                       10.5   9.24  )-----------( 443      ( 2023 07:34 )             32.3     WBC Count: 9.24 K/uL (23 @ 07:34)    CMP:     134<L>  |  96  |  26<H>  ----------------------------<  129<H>  3.4<L>   |  29  |  2.02<H>    Ca    8.7      2023 07:34    TPro  7.4  /  Alb  2.9<L>  /  TBili  0.3  /  DBili  x   /  AST  26  /  ALT  16  /  AlkPhos  74      LIVER FUNCTIONS - ( 2023 07:34 )  Alb: 2.9 g/dL / Pro: 7.4 g/dL / ALK PHOS: 74 U/L / ALT: 16 U/L DA / AST: 26 U/L / GGT: x           Urinalysis Basic - ( 2023 07:34 )  Color: Yellow / Appearance: Slightly Turbid / S.010 / pH: x  Gluc: x / Ketone: Negative  / Bili: Negative / Urobili: Negative mg/dL   Blood: x / Protein: 30 mg/dL / Nitrite: Positive   Leuk Esterase: Moderate / RBC: 0-2 /HPF / WBC >50   Sq Epi: x / Non Sq Epi: x / Bacteria: Moderate    Microbiology: reviewed  COVID-19 PCR: NotDetec (2023 07:34)    Radiology--reviewed  < from: CT Head No Cont (23 @ 07:13) >  IMPRESSION:    No evidence of acute intracranial hemorrhage, midline shift or CT   evidence of acute territorial infarct.    If the patient's symptoms persist, consider short interval follow-up head   CT or brain MRI if there are no MRI contraindications.    < end of copied text >  < from: Xray Knee 3 Views, Left (23 @ 07:11) >  IMPRESSION: There is moderate severe multilevel mid and lower thoracic as   well as lumbar degenerative disease with disc space narrowing and   osteophyte formation. There chronic appearing compression deformities at   T12 and L1. There is no definitive acute fracture. Grade 1   anterolisthesis is noted at L4-L5 with associated facet arthrosis.    There is no hip or pelvic fracture identified. Mild bilateral hip   osteoarthrosis present.    There is moderate first CMC joint arthrosis. Mild degenerative changes   are noted at the interphalangeal joints. There is no wrist fracture.    There is moderate tricompartmental arthrosis of the left knee. There is a   small knee joint effusion. Vascular calcification is present.    < end of copied text >  < from: Xray Chest 1 View AP/PA (23 @ 07:11) >    IMPRESSION: The heart size cannot be well assessed. There is bilateral   patchy opacification most likely compatible with atelectasis. There is an   old healed left humerus fracture. Degenerative changes are noted of the   joint spaces.    < end of copied text >  < from: Xray Pelvis AP only (23 @ 07:10) >    IMPRESSION: There is moderate severe multilevel mid and lower thoracic as   well as lumbar degenerative disease with disc space narrowing and   osteophyte formation. There chronic appearing compression deformities at   T12 and L1. There is no definitive acute fracture. Grade 1   anterolisthesis is noted at L4-L5 with associated facet arthrosis.    There is no hip or pelvic fracture identified. Mild bilateral hip   osteoarthrosis present.    There is moderate first CMC joint arthrosis. Mild degenerative changes   are noted at the interphalangeal joints. There is no wrist fracture.    There is moderate tricompartmental arthrosis of the left knee. There is a   small knee joint effusion. Vascular calcification is present.    < end of copied text >    Active Medications--  acetaminophen     Tablet .. 650 milliGRAM(s) Oral every 6 hours PRN  enoxaparin Injectable 30 milliGRAM(s) SubCutaneous every 24 hours  melatonin 3 milliGRAM(s) Oral at bedtime PRN  sodium chloride 0.9%. 1000 milliLiter(s) IV Continuous <Continuous>    Current Antimicrobials:   Prior/Completed Antimicrobials:  cefTRIAXone   IVPB

## 2023-04-09 NOTE — ED PROVIDER NOTE - OBJECTIVE STATEMENT
Patient presents from assisted living with complaint of pain after a fall.  Patient states she is not sure that she fell but EMS reports that they were told she fell.  Patient complains of pain in her left wrist.  Patient also complains of pain in her back which she states is longstanding but is worse today.  Patient also complains of some pain in her left knee.  No headache.  But patient states she feels generally unwell and is dizzy.  Unsure if she hit her head.  No report of fever or vomiting.  Patient denies shortness of breath.

## 2023-04-09 NOTE — ED ADULT NURSE NOTE - OBJECTIVE STATEMENT
89 y/o F PMH OA HTN BIB EMS for unwitnessed fall, Pt A&Ox2, disoriented to time unable to recall events leading up to and after fall. Endorsing left wrist pain. No midline spinal tenderness, HUMPHREYS x4 no obvious external deformity. Denies CP, SOB, n/v/d, fevers, chills, abdominal pain, urinary symptoms, weakness, fatigue, numbness, tingling in upper and lower extremities, HA, blurry vision. VSS updated on plan of care.

## 2023-04-09 NOTE — ED ADULT NURSE REASSESSMENT NOTE - NS ED NURSE REASSESS COMMENT FT1
Received report from night RN, pt at imaging, will assess upon arrival back to ED floor.
Pt returned from imaging, A&Ox2, disoriented to time, unaware of how she fell, endorsing left wrist pain. No obvious external deformity, VSS, awaiting labR and CTr.

## 2023-04-10 LAB
ANION GAP SERPL CALC-SCNC: 9 MMOL/L — SIGNIFICANT CHANGE UP (ref 5–17)
BUN SERPL-MCNC: 22 MG/DL — SIGNIFICANT CHANGE UP (ref 7–23)
CALCIUM SERPL-MCNC: 8.4 MG/DL — SIGNIFICANT CHANGE UP (ref 8.4–10.5)
CHLORIDE SERPL-SCNC: 103 MMOL/L — SIGNIFICANT CHANGE UP (ref 96–108)
CO2 SERPL-SCNC: 30 MMOL/L — SIGNIFICANT CHANGE UP (ref 22–31)
CREAT SERPL-MCNC: 1.58 MG/DL — HIGH (ref 0.5–1.3)
EGFR: 30 ML/MIN/1.73M2 — LOW
GLUCOSE SERPL-MCNC: 91 MG/DL — SIGNIFICANT CHANGE UP (ref 70–99)
HCT VFR BLD CALC: 31.5 % — LOW (ref 34.5–45)
HGB BLD-MCNC: 10.1 G/DL — LOW (ref 11.5–15.5)
MCHC RBC-ENTMCNC: 26.3 PG — LOW (ref 27–34)
MCHC RBC-ENTMCNC: 32.1 GM/DL — SIGNIFICANT CHANGE UP (ref 32–36)
MCV RBC AUTO: 82 FL — SIGNIFICANT CHANGE UP (ref 80–100)
NRBC # BLD: 0 /100 WBCS — SIGNIFICANT CHANGE UP (ref 0–0)
PLATELET # BLD AUTO: 507 K/UL — HIGH (ref 150–400)
POTASSIUM SERPL-MCNC: 3.3 MMOL/L — LOW (ref 3.5–5.3)
POTASSIUM SERPL-SCNC: 3.3 MMOL/L — LOW (ref 3.5–5.3)
RBC # BLD: 3.84 M/UL — SIGNIFICANT CHANGE UP (ref 3.8–5.2)
RBC # FLD: 15.9 % — HIGH (ref 10.3–14.5)
SODIUM SERPL-SCNC: 142 MMOL/L — SIGNIFICANT CHANGE UP (ref 135–145)
WBC # BLD: 6.85 K/UL — SIGNIFICANT CHANGE UP (ref 3.8–10.5)
WBC # FLD AUTO: 6.85 K/UL — SIGNIFICANT CHANGE UP (ref 3.8–10.5)

## 2023-04-10 PROCEDURE — 99232 SBSQ HOSP IP/OBS MODERATE 35: CPT

## 2023-04-10 RX ADMIN — Medication 650 MILLIGRAM(S): at 05:20

## 2023-04-10 RX ADMIN — ENOXAPARIN SODIUM 30 MILLIGRAM(S): 100 INJECTION SUBCUTANEOUS at 10:26

## 2023-04-10 RX ADMIN — SODIUM CHLORIDE 75 MILLILITER(S): 9 INJECTION INTRAMUSCULAR; INTRAVENOUS; SUBCUTANEOUS at 10:18

## 2023-04-10 RX ADMIN — CEFTRIAXONE 100 MILLIGRAM(S): 500 INJECTION, POWDER, FOR SOLUTION INTRAMUSCULAR; INTRAVENOUS at 10:18

## 2023-04-10 NOTE — CARE COORDINATION ASSESSMENT. - NSPASTMEDSURGHISTORY_GEN_ALL_CORE_FT
PAST MEDICAL & SURGICAL HISTORY:  HTN (hypertension)      No significant past surgical history

## 2023-04-10 NOTE — PROGRESS NOTE ADULT - ASSESSMENT
Patient is a 89 year old fame with PMH of HTN, OA presenting from [facility] with a fall.    Acute UTI  S/p Fall  SAM possibly due to dehydration   - UA with pyuria with WBC >50, mod LE, +nitrites, mod bacteria   - follow urine culture -- GNR   - follow renal/bladder ultrasound   - prior cultures reviewed     - continue on ceftriaxone 1g IV Q24h   - CTH negative, PT eval and work up per primary team   - monitor temps/WBC, Cr     Infectious Diseases will continue to follow. Please call with any questions.   Astrid Maharaj M.D.  [facility] Division of Infectious Diseases [phone]

## 2023-04-10 NOTE — PHYSICAL THERAPY INITIAL EVALUATION ADULT - PERTINENT HX OF CURRENT PROBLEM, REHAB EVAL
89 yo F with PMH HTN, OA presenting from [facility] with a fall. Patient is a limited historian due to mental status, collateral from chart review. Called patients niece but was not able to provide additional info. Patient BIBEMS from [facility] for fall. Patient not sure how she fell but currently complains of pain in her left wrist and knee. She currently denies any other sx. Workup in the ED suggestive of UTI. X-rays of wrist, knees, and spine pending.

## 2023-04-10 NOTE — PATIENT CHOICE NOTE. - NSPTCHOICESTATE_GEN_ALL_CORE

## 2023-04-10 NOTE — PROGRESS NOTE ADULT - SUBJECTIVE AND OBJECTIVE BOX
[facility], Division of Infectious Diseases  ANNA Schaefer Y. Patel, S. Shah, G. [facility]  [phone]    Name: CONNIE SHARMA  Age: 89y  Gender: Female  MRN: [id number]    Interval History:  Patient seen and examined at bedside this morning  No acute overnight events. Afebrile  No complaints  Notes reviewed    Antibiotics:  cefTRIAXone   IVPB 1000 milliGRAM(s) IV Intermittent every 24 hours      Medications:  acetaminophen     Tablet .. 650 milliGRAM(s) Oral every 6 hours PRN  cefTRIAXone   IVPB 1000 milliGRAM(s) IV Intermittent every 24 hours  enoxaparin Injectable 30 milliGRAM(s) SubCutaneous every 24 hours  melatonin 3 milliGRAM(s) Oral at bedtime PRN  sodium chloride 0.9%. 1000 milliLiter(s) IV Continuous <Continuous>      Review of Systems:  unable to obtain    Allergies: No Known Allergies    For details regarding the patient's past medical history, social history, family history, and other miscellaneous elements, please refer the initial infectious diseases consultation and/or the admitting history and physical examination for this admission.    Objective:  Vitals:   T(C): 36.9 (04-10-23 @ 05:00), Max: 36.9 (04-10-23 @ 05:00)  HR: 82 (04-10-23 @ 05:00) (78 - 82)  BP: 129/71 (04-10-23 @ 05:00) (111/64 - 148/66)  RR: 18 (04-10-23 @ 05:00) (18 - 18)  SpO2: 91% (04-10-23 @ 05:00) (91% - 95%)    Physical Examination:  General: no acute distress  HEENT: NC/AT, EOMI  Cardio: RRR  Resp: breath sounds heard bilaterally, no rales, wheezes or rhonchi  Abd: soft, NT, ND  Ext: no edema or cyanosis  Skin: warm, dry, no visible rash      Laboratory Studies:  CBC:                       10.1   6.85  )-----------( 507      ( 10 Apr 2023 08:04 )             31.5     CMP: 04-10    142  |  103  |  22  ----------------------------<  91  3.3<L>   |  30  |  1.58<H>    Ca    8.4      10 Apr 2023 08:03    TPro  7.4  /  Alb  2.9<L>  /  TBili  0.3  /  DBili  x   /  AST  26  /  ALT  16  /  AlkPhos  74  04-09    LIVER FUNCTIONS - ( 2023 07:34 )  Alb: 2.9 g/dL / Pro: 7.4 g/dL / ALK PHOS: 74 U/L / ALT: 16 U/L DA / AST: 26 U/L / GGT: x           Urinalysis Basic - ( 2023 07:34 )    Color: Yellow / Appearance: Slightly Turbid / S.010 / pH: x  Gluc: x / Ketone: Negative  / Bili: Negative / Urobili: Negative mg/dL   Blood: x / Protein: 30 mg/dL / Nitrite: Positive   Leuk Esterase: Moderate / RBC: 0-2 /HPF / WBC >50   Sq Epi: x / Non Sq Epi: x / Bacteria: Moderate        Microbiology: reviewed    Culture - Urine (collected 23 @ 08:42)  Source: Catheterized Catheterized  Preliminary Report (04-10-23 @ 10:46):    >100,000 CFU/ml Gram Negative Rods          Radiology: reviewed

## 2023-04-10 NOTE — CARE COORDINATION ASSESSMENT. - PRO ARRIVE FROM
[facility] @ [address], [place] [address], phone [phone]

## 2023-04-10 NOTE — CARE COORDINATION ASSESSMENT. - REFERRAL INFORMATION CONCERNS
Susieethel reported that she does not feel sub-acute rehab was very helpful to patient, and she is hopeful that patient will be able to return to the assisted living on discharge, however patient is currently requiring a 2-person assist for transfers/ ambulation, and the assisted living cannot accommodate that level of assist.

## 2023-04-10 NOTE — PROGRESS NOTE ADULT - SUBJECTIVE AND OBJECTIVE BOX
Chief Complaint: Fall    Interval Events: No events overnight.    Review of Systems:  General: No fevers, chills, weight gain  Skin: No rashes, color changes  Cardiovascular: No chest pain, orthopnea  Respiratory: No shortness of breath, cough  Gastrointestinal: No nausea, abdominal pain  Genitourinary: No incontinence, pain with urination  Musculoskeletal: No pain, swelling, decreased range of motion  Neurological: No headache, weakness  Psychiatric: No depression, anxiety  Endocrine: No weight gain, increased thirst  All other systems are comprehensively negative.    Physical Exam:  Vitals:        Vital Signs Last 24 Hrs  T(C): 36.9 (10 Apr 2023 05:00), Max: 36.9 (10 Apr 2023 05:00)  T(F): 98.5 (10 Apr 2023 05:00), Max: 98.5 (10 Apr 2023 05:00)  HR: 82 (10 Apr 2023 05:00) (78 - 82)  BP: 129/71 (10 Apr 2023 05:00) (111/64 - 148/66)  BP(mean): --  RR: 18 (10 Apr 2023 05:00) (18 - 18)  SpO2: 91% (10 Apr 2023 05:00) (91% - 95%)  Parameters below as of 10 Apr 2023 05:00  Patient On (Oxygen Delivery Method): room air  General: NAD  HEENT: MMM  Neck: No JVD, no carotid bruit  Lungs: CTAB  CV: RRR, nl S1/S2, no M/R/G  Abdomen: S/NT/ND, +BS  Extremities: No LE edema, no cyanosis  Neuro: AAOx3, non-focal  Skin: No rash    Labs:                        10.1   6.85  )-----------( 507      ( 10 Apr 2023 08:04 )             31.5     04-10    142  |  103  |  22  ----------------------------<  91  3.3<L>   |  30  |  1.58<H>    Ca    8.4      10 Apr 2023 08:03    TPro  7.4  /  Alb  2.9<L>  /  TBili  0.3  /  DBili  x   /  AST  26  /  ALT  16  /  AlkPhos  74  04-09    CARDIAC MARKERS ( 09 Apr 2023 07:38 )  x     / x     / 214 U/L / x     / x              ECG/Telemetry: Sinus rhythm

## 2023-04-10 NOTE — PHYSICAL THERAPY INITIAL EVALUATION ADULT - ADDITIONAL COMMENTS
Pt lives in private home with spouse. Pt has no children. Pt has 4 stairs outside +HR and 12 stairs inside +HR.

## 2023-04-10 NOTE — PROGRESS NOTE ADULT - ASSESSMENT
89 yo F with PMH HTN, OA presenting from [facility] with a fall.      #UTI  Rocephin  ID eval noted   Follow cultures    #Fall  X-rays ordered by ED noted  Fall precautions  CT head neg  PT eval    #SAM  Baseline unknown  Likely due to dehydration  Hold Lasix  Nephro eval noted  Bladder US     #HTN  BP running lower  Restart Amlodipine if BP improves    #GOC  Spoke with patients niece, HCP about GOC  Has never thought about it  Palliative care eval for further GOC discussion     #DVT ppx  Renally dosed Lovenox 89 yo F with PMH HTN, OA presenting from [facility] with a fall.      #UTI  Rocephin  ID eval noted   Follow cultures    #Fall  X-rays ordered by ED noted  Fall precautions  CT head neg  PT eval    #SAM  Baseline unknown  Likely due to dehydration  Hold Lasix  Nephro eval noted    #HTN  BP running lower  Restart Amlodipine if BP improves    #GOC  Spoke with patients niece, HCP about GOC  Has never thought about it  Palliative care eval for further GOC discussion     #DVT ppx  Renally dosed Lovenox

## 2023-04-10 NOTE — PHYSICAL THERAPY INITIAL EVALUATION ADULT - LEVEL OF INDEPENDENCE: STAND/SIT, REHAB EVAL
Yes patient is to take Synthroid; reordered as levothyroxine 2/2 patient insurance. TSH 9 in 12/22 and will need repeat TSH and free T4 at appointment in May.      Electronically signed by Ilene Bennett DO on 2/17/2023 at 3:30 PM moderate assist (50% patients effort)

## 2023-04-10 NOTE — CARE COORDINATION ASSESSMENT. - OTHER PERTINENT REFERRAL INFORMATION
met w/ patient @ bedside on [facility] for completion of care coordination assessment, however patient appeared somewhat confused & a little frightened, stating that she did not know where she was or why she was here instead of home and requesting  outreach to her niece.  therefore facilitated call b/w patient and niece, which helped patient to calm down significantly; niece reported that patient had been living w/ her for 1.5y before she was hospitalized @ [facility] in January 2023 and subsequently discharged to sub-acute rehab @ [facility] in [place]. From [facility], Ms. Harris became a new resident of [facility] in [place] as of 02/09/2023 and has been there since. Per wellness dept, patient started becoming more fearful about a week ago when she moved rooms in the assisted living and also started using a wheelchair instead of walking as much as she typically does.

## 2023-04-10 NOTE — PROGRESS NOTE ADULT - SUBJECTIVE AND OBJECTIVE BOX
Patient is a 89y old  Female who presents with a chief complaint of Dizziness  Fall (2023 10:51)      INTERVAL HPI/OVERNIGHT EVENTS: Patient seen and examined at bedside. No overnight events.     MEDICATIONS  (STANDING):  cefTRIAXone   IVPB 1000 milliGRAM(s) IV Intermittent every 24 hours  enoxaparin Injectable 30 milliGRAM(s) SubCutaneous every 24 hours  sodium chloride 0.9%. 1000 milliLiter(s) (75 mL/Hr) IV Continuous <Continuous>    MEDICATIONS  (PRN):  acetaminophen     Tablet .. 650 milliGRAM(s) Oral every 6 hours PRN Temp greater or equal to 38C (100.4F), Mild Pain (1 - 3)  melatonin 3 milliGRAM(s) Oral at bedtime PRN Insomnia      Allergies    No Known Allergies    Intolerances        REVIEW OF SYSTEMS:  Unable to obtain meaningful ROS due to mental status      Vital Signs Last 24 Hrs  T(C): 36.9 (10 Apr 2023 05:00), Max: 36.9 (10 Apr 2023 05:00)  T(F): 98.5 (10 Apr 2023 05:00), Max: 98.5 (10 Apr 2023 05:00)  HR: 82 (10 Apr 2023 05:00) (77 - 82)  BP: 129/71 (10 Apr 2023 05:00) (101/60 - 148/66)  BP(mean): --  RR: 18 (10 Apr 2023 05:00) (18 - 18)  SpO2: 91% (10 Apr 2023 05:00) (91% - 95%)    Parameters below as of 10 Apr 2023 05:00  Patient On (Oxygen Delivery Method): room air        PHYSICAL EXAM:  General: Well developed, well nourished, no apparent distress  HEENT: NAD, elderly   Neck: Supple, nontender, no palpable mass  Neurology: Awake, confused, sensation intact   Respiratory: Diminished breath sounds at bases, on room air   Cardio: S1,S2, no murmurs, rubs or gallops  Abdominal: Soft, non-tender, non-distended bowel sounds present x4, no palpable masses  Extremities: No clubbing, cyanosis or edema, peripheral pulses present  MSK: Normal range of motion, no joint erythema or warmth, no joint swelling   Heme: No obvious ecchymosis or petechiae   Skin: warm, dry, normal color      LABS:                        10.5   9.24  )-----------( 443      ( 2023 07:34 )             32.3     CBC Full  -  ( 2023 07:34 )  WBC Count : 9.24 K/uL  Hemoglobin : 10.5 g/dL  Hematocrit : 32.3 %  Platelet Count - Automated : 443 K/uL  Mean Cell Volume : 82.6 fl  Mean Cell Hemoglobin : 26.9 pg  Mean Cell Hemoglobin Concentration : 32.5 gm/dL  Auto Neutrophil # : 7.30 K/uL  Auto Lymphocyte # : 1.10 K/uL  Auto Monocyte # : 0.73 K/uL  Auto Eosinophil # : 0.03 K/uL  Auto Basophil # : 0.04 K/uL  Auto Neutrophil % : 79.1 %  Auto Lymphocyte % : 11.9 %  Auto Monocyte % : 7.9 %  Auto Eosinophil % : 0.3 %  Auto Basophil % : 0.4 %    2023 07:34    134    |  96     |  26     ----------------------------<  129    3.4     |  29     |  2.02     Ca    8.7        2023 07:34    TPro  7.4    /  Alb  2.9    /  TBili  0.3    /  DBili  x      /  AST  26     /  ALT  16     /  AlkPhos  74     2023 07:34      Urinalysis Basic - ( 2023 07:34 )    Color: Yellow / Appearance: Slightly Turbid / S.010 / pH: x  Gluc: x / Ketone: Negative  / Bili: Negative / Urobili: Negative mg/dL   Blood: x / Protein: 30 mg/dL / Nitrite: Positive   Leuk Esterase: Moderate / RBC: 0-2 /HPF / WBC >50   Sq Epi: x / Non Sq Epi: x / Bacteria: Moderate      CAPILLARY BLOOD GLUCOSE              RADIOLOGY & ADDITIONAL TESTS:     Consultant(s) Notes Reviewed:  [x] YES  [ ] NO< from: CT Head No Cont (23 @ 07:13) >    ACC: [id number] EXAM:  CT BRAIN   ORDERED BY: GALINA GONGORA     PROCEDURE DATE:  2023          INTERPRETATION:  CLINICAL INDICATION:  fall    TECHNIQUE: Noncontrast CT examination of the head was performed using   contiguous 5 mm CT images.    COMPARISON: There are no prior studies available for comparison.    FINDINGS:    There is no evidence of mass or acute intracranial hemorrhage. Ventricles   and sulci are prominent consistent with age-related parenchymal volume   loss. No midline shift or other significant mass effect is noted. There   is no CT evidence of acute territorial infarct. There are periventricular   white matter hypodensities that are nonspecific in nature but may reflect   chronic ischemic microvascular disease.    The visualized paranasal sinuses and tympanomastoid spaces are clear.   Orbits and orbital contents are unremarkable.    There is no depressed calvarial fracture.        IMPRESSION:    No evidence of acute intracranial hemorrhage, midline shift or CT   evidence of acute territorial infarct.    If the patient's symptoms persist, consider short interval follow-up head   CT or brain MRI if there are no MRI contraindications.    --- End of Report ---            SUSAN CESAR MD; Attending Radiologist  This document has been electronically signed. 2023  7:39AM    < end of copied text >    < from: Xray Knee 3 Views, Left (23 @ 07:11) >    ACC: [id number] EXAM:  XR WRIST COMP MIN 3 VIEWS LT   ORDERED BY: GALINA GONGORA     ACC: [id number] EXAM:  XR KNEE AP LAT OBL 3 VIEWS LT   ORDERED BY: GALINA GONGORA     ACC: [id number] EXAM:  XR LS SPINE AP LAT 2-3 VIEWS   ORDERED BY: GALINA GONGORA     ACC: [id number] EXAM:  XR T SPINE 2 VIEWS   ORDERED BY: GALINA GONGORA     ACC: [id number] EXAM:  XR PELVIS AP ONLY 1-2 VIEWS   ORDERED BY: GALINA GONGORA     PROCEDURE DATE:  2023          INTERPRETATION:  AP pelvis, 3 views of the lumbar spine, 2 views of the   thoracic spine and 3 views of the left knee. 3 views of left wrist.    CLINICAL INDICATION: Low back, pelvic and left knee pain. Left wrist pain.    IMPRESSION: There is moderate severe multilevel mid and lower thoracic as   well as lumbar degenerative disease with disc space narrowing and   osteophyte formation. There chronic appearing compression deformities at   T12 and L1. There is no definitive acute fracture. Grade 1   anterolisthesis is noted at L4-L5 with associated facet arthrosis.    There is no hip or pelvic fracture identified. Mild bilateral hip   osteoarthrosis present.    There is moderate first CMC joint arthrosis. Mild degenerative changes   are noted at the interphalangeal joints. There is no wrist fracture.    There is moderate tricompartmental arthrosis of the left knee. There is a   small knee joint effusion. Vascular calcification is present.    --- End of Report ---            ABBY DAVIS MD; Attending Radiologist  This document has beenelectronically signed. 2023  1:41PM    < end of copied text >      Care Discussed with [x] Consultants  [x] Patient  [ ] Family  [ ]      [ x] Other; RN  DVT ppx   Patient is a 89y old  Female who presents with a chief complaint of Dizziness  Fall (2023 10:51)      INTERVAL HPI/OVERNIGHT EVENTS: Patient seen and examined at bedside. No overnight events. More awake and alert today, eating breakfast      MEDICATIONS  (STANDING):  cefTRIAXone   IVPB 1000 milliGRAM(s) IV Intermittent every 24 hours  enoxaparin Injectable 30 milliGRAM(s) SubCutaneous every 24 hours  sodium chloride 0.9%. 1000 milliLiter(s) (75 mL/Hr) IV Continuous <Continuous>    MEDICATIONS  (PRN):  acetaminophen     Tablet .. 650 milliGRAM(s) Oral every 6 hours PRN Temp greater or equal to 38C (100.4F), Mild Pain (1 - 3)  melatonin 3 milliGRAM(s) Oral at bedtime PRN Insomnia      Allergies    No Known Allergies    Intolerances        REVIEW OF SYSTEMS:  Unable to obtain meaningful ROS due to mental status      Vital Signs Last 24 Hrs  T(C): 36.9 (10 Apr 2023 05:00), Max: 36.9 (10 Apr 2023 05:00)  T(F): 98.5 (10 Apr 2023 05:00), Max: 98.5 (10 Apr 2023 05:00)  HR: 82 (10 Apr 2023 05:00) (77 - 82)  BP: 129/71 (10 Apr 2023 05:00) (101/60 - 148/66)  BP(mean): --  RR: 18 (10 Apr 2023 05:00) (18 - 18)  SpO2: 91% (10 Apr 2023 05:00) (91% - 95%)    Parameters below as of 10 Apr 2023 05:00  Patient On (Oxygen Delivery Method): room air        PHYSICAL EXAM:  General: Well developed, well nourished, no apparent distress  HEENT: NAD, elderly   Neck: Supple, nontender, no palpable mass  Neurology: Awake, intermittently confused, sensation intact   Respiratory: Diminished breath sounds at bases, on room air   Cardio: S1,S2, no murmurs, rubs or gallops  Abdominal: Soft, non-tender, non-distended bowel sounds present x4, no palpable masses  Extremities: No clubbing, cyanosis or edema, peripheral pulses present  MSK: Normal range of motion, no joint erythema or warmth, no joint swelling   Heme: No obvious ecchymosis or petechiae   Skin: warm, dry, normal color      LABS:                        10.5   9.24  )-----------( 443      ( 2023 07:34 )             32.3     CBC Full  -  ( 2023 07:34 )  WBC Count : 9.24 K/uL  Hemoglobin : 10.5 g/dL  Hematocrit : 32.3 %  Platelet Count - Automated : 443 K/uL  Mean Cell Volume : 82.6 fl  Mean Cell Hemoglobin : 26.9 pg  Mean Cell Hemoglobin Concentration : 32.5 gm/dL  Auto Neutrophil # : 7.30 K/uL  Auto Lymphocyte # : 1.10 K/uL  Auto Monocyte # : 0.73 K/uL  Auto Eosinophil # : 0.03 K/uL  Auto Basophil # : 0.04 K/uL  Auto Neutrophil % : 79.1 %  Auto Lymphocyte % : 11.9 %  Auto Monocyte % : 7.9 %  Auto Eosinophil % : 0.3 %  Auto Basophil % : 0.4 %    2023 07:34    134    |  96     |  26     ----------------------------<  129    3.4     |  29     |  2.02     Ca    8.7        2023 07:34    TPro  7.4    /  Alb  2.9    /  TBili  0.3    /  DBili  x      /  AST  26     /  ALT  16     /  AlkPhos  74     2023 07:34      Urinalysis Basic - ( 2023 07:34 )    Color: Yellow / Appearance: Slightly Turbid / S.010 / pH: x  Gluc: x / Ketone: Negative  / Bili: Negative / Urobili: Negative mg/dL   Blood: x / Protein: 30 mg/dL / Nitrite: Positive   Leuk Esterase: Moderate / RBC: 0-2 /HPF / WBC >50   Sq Epi: x / Non Sq Epi: x / Bacteria: Moderate      CAPILLARY BLOOD GLUCOSE              RADIOLOGY & ADDITIONAL TESTS:     Consultant(s) Notes Reviewed:  [x] YES  [ ] NO< from: CT Head No Cont (23 @ 07:13) >    ACC: [id number] EXAM:  CT BRAIN   ORDERED BY: GALINA GONGORA     PROCEDURE DATE:  2023          INTERPRETATION:  CLINICAL INDICATION:  fall    TECHNIQUE: Noncontrast CT examination of the head was performed using   contiguous 5 mm CT images.    COMPARISON: There are no prior studies available for comparison.    FINDINGS:    There is no evidence of mass or acute intracranial hemorrhage. Ventricles   and sulci are prominent consistent with age-related parenchymal volume   loss. No midline shift or other significant mass effect is noted. There   is no CT evidence of acute territorial infarct. There are periventricular   white matter hypodensities that are nonspecific in nature but may reflect   chronic ischemic microvascular disease.    The visualized paranasal sinuses and tympanomastoid spaces are clear.   Orbits and orbital contents are unremarkable.    There is no depressed calvarial fracture.        IMPRESSION:    No evidence of acute intracranial hemorrhage, midline shift or CT   evidence of acute territorial infarct.    If the patient's symptoms persist, consider short interval follow-up head   CT or brain MRI if there are no MRI contraindications.    --- End of Report ---            SUSAN CESAR MD; Attending Radiologist  This document has been electronically signed. 2023  7:39AM    < end of copied text >    < from: Xray Knee 3 Views, Left (23 @ 07:11) >    ACC: [id number] EXAM:  XR WRIST COMP MIN 3 VIEWS LT   ORDERED BY: GALINA GONGORA     ACC: [id number] EXAM:  XR KNEE AP LAT OBL 3 VIEWS LT   ORDERED BY: GALINA GONGORA     ACC: [id number] EXAM:  XR LS SPINE AP LAT 2-3 VIEWS   ORDERED BY: GALINA GONGORA     ACC: [id number] EXAM:  XR T SPINE 2 VIEWS   ORDERED BY: GALINA GONGORA     ACC: [id number] EXAM:  XR PELVIS AP ONLY 1-2 VIEWS   ORDERED BY: GALINA GONGORA     PROCEDURE DATE:  2023          INTERPRETATION:  AP pelvis, 3 views of the lumbar spine, 2 views of the   thoracic spine and 3 views of the left knee. 3 views of left wrist.    CLINICAL INDICATION: Low back, pelvic and left knee pain. Left wrist pain.    IMPRESSION: There is moderate severe multilevel mid and lower thoracic as   well as lumbar degenerative disease with disc space narrowing and   osteophyte formation. There chronic appearing compression deformities at   T12 and L1. There is no definitive acute fracture. Grade 1   anterolisthesis is noted at L4-L5 with associated facet arthrosis.    There is no hip or pelvic fracture identified. Mild bilateral hip   osteoarthrosis present.    There is moderate first CMC joint arthrosis. Mild degenerative changes   are noted at the interphalangeal joints. There is no wrist fracture.    There is moderate tricompartmental arthrosis of the left knee. There is a   small knee joint effusion. Vascular calcification is present.    --- End of Report ---            ABBY DAVIS MD; Attending Radiologist  This document has beenelectronically signed. 2023  1:41PM    < end of copied text >      Care Discussed with [x] Consultants  [x] Patient  [ ] Family  [ ]      [ x] Other; RN  DVT ppx

## 2023-04-10 NOTE — CAREGIVER ENGAGEMENT NOTE - CAREGIVER NAME
Patient's niece @ [phone]; mio dept for patient's assisted living @ [phone]
Lactation Consult

## 2023-04-10 NOTE — PROGRESS NOTE ADULT - ASSESSMENT
The patient is a 89 year old female with a history of HTN, dementia who presents with a fall.    Plan:  - ECG with LBBB; no prior for comparison  - Cardiac enzymes negative  - Renal function improving with IV fluids  - Hold furosemide  - Hold amlodipine - can resume if BP trends up  - IV antibiotics for possible UTI

## 2023-04-10 NOTE — CAREGIVER ENGAGEMENT NOTE - CAREGIVER EDUCATION - TYPES DISCUSSED
After-care skilled tasks/Discharge plan/DME/Insurance benefits/Post-acute care agency contact/Post-discharge escalation process/Transportation coordination/Transportation letter provided

## 2023-04-10 NOTE — CARE COORDINATION ASSESSMENT. - NSCAREPROVIDERS_GEN_ALL_CORE_FT
CARE PROVIDERS:  Accepting Physician: Latoya Godinez  Administration: Michele Grimes  Administration: Nina Ross  Admitting: Latoya Godinez  Attending: Latoya Godinez  Case Management: Marissa Kennedy  Case Management: Charo Siddiqui  Consultant: Mario Jean Baptiste  Consultant: Elvis Cheek  Consultant: Carlos Burnett  Consultant: Moe Oh  Consultant: Astrid Maharaj  ED Attending: Al Nair  ED Nurse: Colletta, Morgan  Emergency Medicine: Poli Rowe  Infection Control: Ashley Maciel  Nurse: Nat Troncoso  Ordered: Poli Rowe  Ordered: Physician, Ordering  Outpatient Provider: Carlos Burnett  Override: Sultana Trujillo  PCA/Nursing Assistant: Sage Nix  PCA/Nursing Assistant: Latasha Smith  Physical Therapy: Jd Nava  Physical Therapy: Marcus Junior  Registered Dietitian: Crystal Sanon  : Sofía Dewey  : Tonaj Us  Team: [facility], Team  Team: [facility], Team  UR// Supp. Assoc.: Nadya Mayo

## 2023-04-10 NOTE — PHYSICAL THERAPY INITIAL EVALUATION ADULT - RANGE OF MOTION EXAMINATION, REHAB EVAL
Right UE ROM was WFL (within functional limits)/bilateral lower extremity ROM was WFL (within functional limits) Right shoulder flexion limited, Left shoulder flexion limited./bilateral lower extremity ROM was WFL (within functional limits)/deficits as listed below Right shoulder flexion limited to 45 deg, Left shoulder flexion limited to 30 deg./bilateral lower extremity ROM was WFL (within functional limits)/deficits as listed below

## 2023-04-10 NOTE — CAREGIVER ENGAGEMENT NOTE - CAREGIVER EDUCATION NOTES - FREE TEXT
met w/ patient @ bedside on [facility] for completion of care coordination assessment, however patient appeared somewhat confused & a little frightened, stating that she did not know where she was or why she was here instead of home and requesting  outreach to her niece.  therefore facilitated call b/w patient and niece, which helped patient to calm down significantly; niece reported that patient had been living w/ her for 1.5y before she was hospitalized @ [facility] in January 2023 and subsequently discharged to sub-acute rehab @ [facility] in [place]. From [facility], Ms. Harris became a new resident of [facility] in [place] as of 02/09/2023 and has been there since. Per wellness dept, patient started becoming more fearful about a week ago when she moved rooms in the assisted living and also started using a wheelchair instead of walking as much as she typically does. Niece reported that she does not feel sub-acute rehab was very helpful to patient, and she is hopeful that patient will be able to return to the assisted living on discharge, however patient is currently requiring a 2-person assist for transfers/ ambulation, and the assisted living cannot accommodate that level of assist.

## 2023-04-10 NOTE — PHYSICAL THERAPY INITIAL EVALUATION ADULT - GAIT TRAINING, PT EVAL
1 week contact guard for ambulation with ANTOINETTE 1 week contact guard for ambulation with RW 50 feet

## 2023-04-10 NOTE — PROGRESS NOTE ADULT - ASSESSMENT
89y old  Female with h/o HTN presents from assisted living Came to ED s/p Fall.  No C/o Lightheadedness Vertigo.  C/o left wrist pain and also left hip area pain.   Left wrist splint in place.  Multiple X rays are done - No Fx  CT Head - No acute pathology.  No signs of CVA.  Cr - 2.02, now 1.58  No signs of CVA.  Pain meds.  PT   Fall/safety precautions.   Would follow.

## 2023-04-10 NOTE — CARE COORDINATION ASSESSMENT. - RETURN TO PRIOR LIVING ARRANGEMENTS
Patient's ability to return to her assisted living facility is dependent on her functional capabilities @ discharge. Given her current need for a 2-person assist, patient will likely need to go to sub-acute rehab on discharge from the hospital.

## 2023-04-10 NOTE — PROGRESS NOTE ADULT - SUBJECTIVE AND OBJECTIVE BOX
Patient is a 89y old  Female who presents with a chief complaint of Fall.    HPI: 89y old  Female with h/o HTN presents from assisted living with complaint of pain after a fall.  Patient states she is not sure that she fell but EMS reports that they were told she fell.  Patient complains of pain in her left wrist.  Patient also complains of pain in her back which she states is longstanding but is worse today.  Patient also complains of some pain in her left knee.  No headache.  But patient states she feels generally unwell and is dizzy.  Unsure if she hit her head.  No report of fever or vomiting.  Patient denies shortness of breath.  No signs of head or facial injury.  No lateralized weakness.  No seizure reported.  No tongue bite.    Interval history -     No new complaints    MEDICATIONS  (STANDING):    cefTRIAXone   IVPB 1000 milliGRAM(s) IV Intermittent every 24 hours  enoxaparin Injectable 30 milliGRAM(s) SubCutaneous every 24 hours  sodium chloride 0.9%. 1000 milliLiter(s) (75 mL/Hr) IV Continuous <Continuous>    MEDICATIONS  (PRN):    acetaminophen     Tablet .. 650 milliGRAM(s) Oral every 6 hours PRN Temp greater or equal to 38C (100.4F), Mild Pain (1 - 3)  melatonin 3 milliGRAM(s) Oral at bedtime PRN Insomnia    Allergies    No Known Allergies    REVIEW OF SYSTEMS: Poor cognition.    Vital Signs Last 24 Hrs    T(C): 36.9 (04-10-23 @ 05:00), Max: 36.9 (04-10-23 @ 05:00)  T(F): 98.5 (04-10-23 @ 05:00), Max: 98.5 (04-10-23 @ 05:00)  HR: 82 (04-10-23 @ 05:00) (78 - 82)  BP: 129/71 (04-10-23 @ 05:00) (111/64 - 148/66)  BP(mean): --  RR: 18 (04-10-23 @ 05:00) (18 - 18)  SpO2: 91% (04-10-23 @ 05:00) (91% - 95%)    GENERAL: NAD, well-groomed, well-developed  HEAD:  Atraumatic, Normocephalic  EYES: EOMI, PERRLA, conjunctiva and sclera clear  NECK: Supple, No JVD,     On Neurological Examination:    Mental Status - Pt is alert, awake, Poor cognition. Follows commands.    Speech -  Normal. Pt has no aphasia.    Cranial Nerves - Pupils 3 mm equal and reactive to light, extraocular eye movements intact. No facial asymmetry. Tongue - is in midline.    Motor Exam - C/o left wrist pain and also left hip area pain. moves right sided extremities well.    Sensory Exam -  Pt withdraws all extremities equally on stimulation.    Gait - Couldn't be tested currently.    Deep tendon Reflexes - 2 plus all over.    Neck Supple -  Yes.    LABS:             CBC Full  -  ( 10 Apr 2023 08:04 )  WBC Count : 6.85 K/uL  RBC Count : 3.84 M/uL  Hemoglobin : 10.1 g/dL  Hematocrit : 31.5 %  Platelet Count - Automated : 507 K/uL  Mean Cell Volume : 82.0 fl  Mean Cell Hemoglobin : 26.3 pg  Mean Cell Hemoglobin Concentration : 32.1 gm/dL    04-10    142  |  103  |  22  ----------------------------<  91  3.3<L>   |  30  |  1.58<H>    Ca    8.4      10 Apr 2023 08:03    TPro  7.4  /  Alb  2.9<L>  /  TBili  0.3  /  DBili  x   /  AST  26  /  ALT  16  /  AlkPhos  74  04-09    RADIOLOGY & ADDITIONAL STUDIES:    < from: Xray Knee 3 Views, Left (04.09.23 @ 07:11) >    INTERPRETATION:  AP pelvis, 3 views of the lumbar spine, 2 views of the   thoracic spine and 3 views of the left knee. 3 views of left wrist.    CLINICAL INDICATION: Low back, pelvic and left knee pain. Left wrist pain.    IMPRESSION: There is moderate severe multilevel mid and lower thoracic as   well as lumbar degenerative disease with disc space narrowing and   osteophyte formation. There chronic appearing compression deformities at   T12 and L1. There is no definitive acute fracture. Grade 1   anterolisthesis is noted at L4-L5 with associated facet arthrosis.    There is no hip or pelvic fracture identified. Mild bilateral hip   osteoarthrosis present.    There is moderate first CMC joint arthrosis. Mild degenerative changes   are noted at the interphalangeal joints. There is no wrist fracture.    There is moderate tricompartmental arthrosis of the left knee. There is a   small knee joint effusion. Vascular calcification is present.    < end of copied text >      Multiple X rays are done - Reports to follow.    < from: CT Head No Cont (04.09.23 @ 07:13) >    No evidence of acute intracranial hemorrhage, midline shift or CT evidence of acute territorial infarct.    < end of copied text >

## 2023-04-10 NOTE — PHYSICAL THERAPY INITIAL EVALUATION ADULT - IMPAIRMENTS CONTRIBUTING IMPAIRED BED MOBILITY, REHAB EVAL
[facility] Hospitalist H&P      History Of Present Illness  55 y/o male was at [employer] and went to the washroom were he had a syncopal episode. He states he went to bathroom and noticed loose stool. When he got up he passed out. He states he has been having some nausea and abdominal discomfort recently. In the ER he was noted to have a leukocytosis. He denies fevers, chills, night sweats, weight loss. Cardiology was consulted for his complaints of left sided chest pain. EKG showed NSR with no ischemic changes and troponins were negative x 1. 2D echocardiogram to be ordered.       Past Medical History  Past Medical History:   Diagnosis Date   • Essential (primary) hypertension         Surgical History  No past surgical history on file.     Social History  Social History     Tobacco Use   • Smoking status: Every Day     Types: Cigarettes   • Smokeless tobacco: Never   Vaping Use   • Vaping Use: never used   Substance Use Topics   • Alcohol use: Not Currently   • Drug use: Never       Family History  No family history on file.     Allergies  ALLERGIES:  Acetaminophen and Nsaids    Medications  (Not in a hospital admission)    No current facility-administered medications for this encounter.     Current Outpatient Medications   Medication Sig Dispense Refill   • amLODIPine (NORVASC) 10 MG tablet Take 10 mg by mouth daily.     • PARoxetine (PAXIL) 10 MG tablet Take 10 mg by mouth every morning.           Review of Systems    Constitutional: No unexplained weight loss, fevers, chills, night sweats, no swollen glands in the neck, axilla, or groin  Skin: No rashes or nonhealing ulcers.  Eyes: No diplopia, eye pain, or recent worsening in visual acuity.  ENT: No sore throat or hearing loss  Endocrine: No thyroid problems, no history of diabetes, polyuria or nocturia    Cardiovascular: chest pain  Respiratory: No history of asthma, COPD, exertional shortness of breath, or  BERT  Gastrointestinal:midepigastric abdominal pain   Musculoskeletal: No joint swelling,   Neurologic: syncope   Hematologic: No unusual bruising or bleeding. no history of DVT/PE, clotting or bleeding disorders.  Psychiatric: No psychiatric problems, hallucinations or depression      Last Recorded Vitals  Visit Vitals  BP (!) 154/86   Pulse 74   Temp 98.2 °F (36.8 °C)   Resp 18   Wt 81.6 kg (180 lb)   SpO2 93%   v      PE:   General: The patient is alert and oriented ×3, in no acute distress.  She is well-nourished and appears euvolemic.     Head:  Normocephalic  Eyes: Pupils are reactive and bilaterally symmetric.  No scleral icterus was seen.  EOMI     Mouth:  Oral mucosa is moist.  There were no oral ulcers or pharyngeal exudates seen.  Neck: Supple.  No increased jugular venous distention.  No cervical or supraclavicular lymphadenopathy was noted.  Good carotid upstroke    Cardiac: Heart is regular rate and rhythm without murmur, normal S3, S4  Lungs: Lungs are clear to auscultation bilaterally.  Abdomen: Soft, nontender, bowel sounds are normoactive.  No masses or organomegaly was observed  Musculoskeletal: Calves are soft and symmetric.  Negative Homans sign    Neuro: Brief neurologic exam assessing strength, coordination, and sensation is grossly intact  Vasc: Dorsalis pedis pulses are palpable bilaterally.  Capillary refill is less than 2 seconds in the toes.  Psych: Normal affect  Skin: No pathologic skin changes were seen     Imaging  CTA CHEST W WO CONTRAST   Final Result          1.  No acute intrathoracic, intra-abdominal or pelvic process.      Electronically Signed by: KALE CARABALLO M.D.    Signed on: 1/13/2023 2:05 PM          CTA ABDOMEN PELVIS W CONTRAST   Final Result          1.  No acute intrathoracic, intra-abdominal or pelvic process.      Electronically Signed by: KALE CARABALLO M.D.    Signed on: 1/13/2023 2:05 PM          CT HEAD WO CONTRAST   Final Result      No CT evidence of an acute  intracranial abnormality.         If the patient's neurological symptoms persist and/or if concern for   hyperacute ischemia recommend further evaluation with MRI as clinically   warranted.      Electronically Signed by: KALE CARABALLO M.D.    Signed on: 1/13/2023 1:44 PM          XR CHEST PA AND LATERAL 2 VIEWS   Final Result       No consolidation or edema.         Electronically Signed by: VICKY OLIVERA M.D.    Signed on: 1/13/2023 11:35 AM              Labs   Recent Labs   Lab 01/13/23  1108   WBC 23.0*   RBC 5.71   HGB 17.5*   HCT 49.2        Recent Labs   Lab 01/13/23  1108   SODIUM 134*   POTASSIUM 3.7   CHLORIDE 102   CO2 21   BUN 6   CREATININE 0.65*   GLUCOSE 92   CALCIUM 9.2     No results for input(s): INR in the last 72 hours.    Diagnosis:  Patient Active Problem List   Diagnosis   • Chest pain   • Syncope   • Leukocytosis   • Chest pain, unspecified type     Assessment/Plan:   Syncope  -possibly orthostatic related vs vasovagal syncope.   -monitor on tele  -Likely secondary to dehydration in light of viral gastroenteritis   -EKG reviewed and no ischemic changes.   -2D echo ordered. Discussed with Dr. Sanders   -troponin negative x1. Repeat troponin     Nausea  Loose stool   -secondary to viral gastroenteritis   -IV fluid hydration   -IV Zofran   -Lipase normal   -CTA abdomen with normal gall bladder, pancreas    Hyponatremia  Na 134. Likely secondary to dehydration   Start normal saline at 100 cc/hr       Essential HYN   -cont home meds    DVT prophylaxis   DO CHARLOTTE Ernandez Hospitalist      cognition/pain/decreased ROM/decreased strength

## 2023-04-11 LAB
-  AMIKACIN: SIGNIFICANT CHANGE UP
-  AMOXICILLIN/CLAVULANIC ACID: SIGNIFICANT CHANGE UP
-  AMPICILLIN/SULBACTAM: SIGNIFICANT CHANGE UP
-  AMPICILLIN: SIGNIFICANT CHANGE UP
-  AZTREONAM: SIGNIFICANT CHANGE UP
-  CEFAZOLIN: SIGNIFICANT CHANGE UP
-  CEFEPIME: SIGNIFICANT CHANGE UP
-  CEFTRIAXONE: SIGNIFICANT CHANGE UP
-  CEFUROXIME: SIGNIFICANT CHANGE UP
-  CIPROFLOXACIN: SIGNIFICANT CHANGE UP
-  ERTAPENEM: SIGNIFICANT CHANGE UP
-  GENTAMICIN: SIGNIFICANT CHANGE UP
-  LEVOFLOXACIN: SIGNIFICANT CHANGE UP
-  MEROPENEM: SIGNIFICANT CHANGE UP
-  NITROFURANTOIN: SIGNIFICANT CHANGE UP
-  PIPERACILLIN/TAZOBACTAM: SIGNIFICANT CHANGE UP
-  TOBRAMYCIN: SIGNIFICANT CHANGE UP
-  TRIMETHOPRIM/SULFAMETHOXAZOLE: SIGNIFICANT CHANGE UP
ANION GAP SERPL CALC-SCNC: 14 MMOL/L — SIGNIFICANT CHANGE UP (ref 5–17)
BUN SERPL-MCNC: 16 MG/DL — SIGNIFICANT CHANGE UP (ref 7–23)
CALCIUM SERPL-MCNC: 8.6 MG/DL — SIGNIFICANT CHANGE UP (ref 8.4–10.5)
CHLORIDE SERPL-SCNC: 105 MMOL/L — SIGNIFICANT CHANGE UP (ref 96–108)
CO2 SERPL-SCNC: 24 MMOL/L — SIGNIFICANT CHANGE UP (ref 22–31)
CREAT SERPL-MCNC: 1.35 MG/DL — HIGH (ref 0.5–1.3)
CULTURE RESULTS: SIGNIFICANT CHANGE UP
EGFR: 36 ML/MIN/1.73M2 — LOW
GLUCOSE SERPL-MCNC: 92 MG/DL — SIGNIFICANT CHANGE UP (ref 70–99)
HCT VFR BLD CALC: 34.7 % — SIGNIFICANT CHANGE UP (ref 34.5–45)
HGB BLD-MCNC: 11.1 G/DL — LOW (ref 11.5–15.5)
MCHC RBC-ENTMCNC: 26.2 PG — LOW (ref 27–34)
MCHC RBC-ENTMCNC: 32 GM/DL — SIGNIFICANT CHANGE UP (ref 32–36)
MCV RBC AUTO: 82 FL — SIGNIFICANT CHANGE UP (ref 80–100)
METHOD TYPE: SIGNIFICANT CHANGE UP
NRBC # BLD: 0 /100 WBCS — SIGNIFICANT CHANGE UP (ref 0–0)
ORGANISM # SPEC MICROSCOPIC CNT: SIGNIFICANT CHANGE UP
ORGANISM # SPEC MICROSCOPIC CNT: SIGNIFICANT CHANGE UP
PLATELET # BLD AUTO: 559 K/UL — HIGH (ref 150–400)
POTASSIUM SERPL-MCNC: 3.4 MMOL/L — LOW (ref 3.5–5.3)
POTASSIUM SERPL-SCNC: 3.4 MMOL/L — LOW (ref 3.5–5.3)
RBC # BLD: 4.23 M/UL — SIGNIFICANT CHANGE UP (ref 3.8–5.2)
RBC # FLD: 16 % — HIGH (ref 10.3–14.5)
SODIUM SERPL-SCNC: 143 MMOL/L — SIGNIFICANT CHANGE UP (ref 135–145)
SPECIMEN SOURCE: SIGNIFICANT CHANGE UP
WBC # BLD: 8.98 K/UL — SIGNIFICANT CHANGE UP (ref 3.8–10.5)
WBC # FLD AUTO: 8.98 K/UL — SIGNIFICANT CHANGE UP (ref 3.8–10.5)

## 2023-04-11 PROCEDURE — 99232 SBSQ HOSP IP/OBS MODERATE 35: CPT

## 2023-04-11 PROCEDURE — 76775 US EXAM ABDO BACK WALL LIM: CPT | Mod: 26

## 2023-04-11 RX ORDER — AMLODIPINE BESYLATE 2.5 MG/1
5 TABLET ORAL DAILY
Refills: 0 | Status: DISCONTINUED | OUTPATIENT
Start: 2023-04-11 | End: 2023-04-12

## 2023-04-11 RX ORDER — SODIUM CHLORIDE 9 MG/ML
1000 INJECTION, SOLUTION INTRAVENOUS
Refills: 0 | Status: DISCONTINUED | OUTPATIENT
Start: 2023-04-11 | End: 2023-04-12

## 2023-04-11 RX ORDER — POTASSIUM CHLORIDE 20 MEQ
40 PACKET (EA) ORAL ONCE
Refills: 0 | Status: COMPLETED | OUTPATIENT
Start: 2023-04-11 | End: 2023-04-11

## 2023-04-11 RX ADMIN — Medication 40 MILLIEQUIVALENT(S): at 08:48

## 2023-04-11 RX ADMIN — SODIUM CHLORIDE 75 MILLILITER(S): 9 INJECTION INTRAMUSCULAR; INTRAVENOUS; SUBCUTANEOUS at 05:50

## 2023-04-11 RX ADMIN — AMLODIPINE BESYLATE 5 MILLIGRAM(S): 2.5 TABLET ORAL at 10:24

## 2023-04-11 RX ADMIN — Medication 3 MILLIGRAM(S): at 21:33

## 2023-04-11 RX ADMIN — SODIUM CHLORIDE 50 MILLILITER(S): 9 INJECTION, SOLUTION INTRAVENOUS at 08:48

## 2023-04-11 RX ADMIN — CEFTRIAXONE 100 MILLIGRAM(S): 500 INJECTION, POWDER, FOR SOLUTION INTRAMUSCULAR; INTRAVENOUS at 10:24

## 2023-04-11 RX ADMIN — ENOXAPARIN SODIUM 30 MILLIGRAM(S): 100 INJECTION SUBCUTANEOUS at 10:24

## 2023-04-11 NOTE — SOCIAL WORK PROGRESS NOTE - NSSWPROGRESSNOTE_GEN_ALL_CORE
ernie spoke with pts tequila Vang joselito  [phone] regarding dc planning and recommendations for [facility] as pt unable to return to the [facility] due to current ambulatory status. pts niece overwhelmed and disappointed as current experience at [facility] was not positive and therefore she would like pt to return to the [facility] ( however pt is currently mod assist x2 ).  pts niece aware that worker will need EDWARD options from her asap. workers name and number provided. niethel plans to call her son in [place] to discuss and will contact this worker later today. LINSEY requested. sw to follow for dc planning.

## 2023-04-11 NOTE — PROGRESS NOTE ADULT - ASSESSMENT
89y old  Female with h/o HTN presents from assisted living Came to ED s/p Fall.  No C/o Lightheadedness Vertigo.  C/o left wrist pain and also left hip area pain.   Left wrist splint in place.  Multiple X rays are done - No Fx  CT Head - No acute pathology.  No signs of CVA.  Pain meds.  Continue PT   Fall/safety precautions.   Would follow.

## 2023-04-11 NOTE — PROGRESS NOTE ADULT - SUBJECTIVE AND OBJECTIVE BOX
Patient is a 89y old  Female who presents with a chief complaint of Dizziness  Fall (10 Apr 2023 10:59)    Patient seen in follow up for SAM.        PAST MEDICAL HISTORY:  HTN (hypertension)      MEDICATIONS  (STANDING):  cefTRIAXone   IVPB 1000 milliGRAM(s) IV Intermittent every 24 hours  enoxaparin Injectable 30 milliGRAM(s) SubCutaneous every 24 hours  sodium chloride 0.9%. 1000 milliLiter(s) (75 mL/Hr) IV Continuous <Continuous>    MEDICATIONS  (PRN):  acetaminophen     Tablet .. 650 milliGRAM(s) Oral every 6 hours PRN Temp greater or equal to 38C (100.4F), Mild Pain (1 - 3)  melatonin 3 milliGRAM(s) Oral at bedtime PRN Insomnia    T(C): 36.6 (04-11-23 @ 05:00), Max: 37 (04-10-23 @ 14:35)  HR: 95 (04-11-23 @ 05:00) (71 - 95)  BP: 139/93 (04-11-23 @ 05:00) (129/71 - 152/81)  RR: 16 (04-11-23 @ 05:00) (16 - 18)  SpO2: 95% (04-11-23 @ 05:00) (91% - 95%)  Wt(kg): --  I&O's Detail    10 Apr 2023 07:01  -  11 Apr 2023 07:00  --------------------------------------------------------  IN:    IV PiggyBack: 50 mL    sodium chloride 0.9%: 900 mL  Total IN: 950 mL    OUT:    Voided (mL): 300 mL  Total OUT: 300 mL    Total NET: 650 mL          PHYSICAL EXAM:  General: No distress  Respiratory: b/l air entry  Cardiovascular: S1 S2  Gastrointestinal: soft  Extremities:  no edema                              11.1   8.98  )-----------( 559      ( 11 Apr 2023 07:37 )             34.7     04-11    143  |  105  |  16  ----------------------------<  92  3.4<L>   |  24  |  1.35<H>    Ca    8.6      11 Apr 2023 07:37          Sodium, Serum: 143 (04-11 @ 07:37)  Sodium, Serum: 142 (04-10 @ 08:03)  Sodium, Serum: 134 (04-09 @ 07:34)    Creatinine, Serum: 1.35 (04-11 @ 07:37)  Creatinine, Serum: 1.58 (04-10 @ 08:03)  Creatinine, Serum: 2.02 (04-09 @ 07:34)    Potassium, Serum: 3.4 (04-11 @ 07:37)  Potassium, Serum: 3.3 (04-10 @ 08:03)  Potassium, Serum: 3.4 (04-09 @ 07:34)    Hemoglobin: 11.1 (04-11 @ 07:37)  Hemoglobin: 10.1 (04-10 @ 08:04)  Hemoglobin: 10.5 (04-09 @ 07:34)

## 2023-04-11 NOTE — PROGRESS NOTE ADULT - SUBJECTIVE AND OBJECTIVE BOX
[facility], Division of Infectious Diseases  ANNA Schaefer Y. Patel, S. Shah, G. [facility]  [phone]    Name: CONNIE SHARMA  Age: 89y  Gender: Female  MRN: [id number]    Interval History:  Patient seen and examined at bedside  No acute overnight events. Afebrile  No complaints.   Notes reviewed    Antibiotics:  cefTRIAXone   IVPB 1000 milliGRAM(s) IV Intermittent every 24 hours      Medications:  acetaminophen     Tablet .. 650 milliGRAM(s) Oral every 6 hours PRN  cefTRIAXone   IVPB 1000 milliGRAM(s) IV Intermittent every 24 hours  dextrose 5% + sodium chloride 0.45%. 1000 milliLiter(s) IV Continuous <Continuous>  enoxaparin Injectable 30 milliGRAM(s) SubCutaneous every 24 hours  melatonin 3 milliGRAM(s) Oral at bedtime PRN      Review of Systems:  Review of systems otherwise negative except as previously noted.    Allergies: No Known Allergies    For details regarding the patient's past medical history, social history, family history, and other miscellaneous elements, please refer the initial infectious diseases consultation and/or the admitting history and physical examination for this admission.    Objective:  Vitals:   T(C): 36.6 (04-11-23 @ 05:00), Max: 37 (04-10-23 @ 14:35)  HR: 95 (04-11-23 @ 05:00) (71 - 95)  BP: 139/93 (04-11-23 @ 05:00) (133/68 - 152/81)  RR: 16 (04-11-23 @ 05:00) (16 - 18)  SpO2: 95% (04-11-23 @ 05:00) (91% - 95%)    Physical Examination:  General: no acute distress  HEENT: NC/AT, EOMI  Cardio: S1, S2 heard, RRR, no murmurs  Resp: decreased b/l breath sounds   Abd: soft, NT, ND  Neuro: no obvious focal deficits  Ext: no edema or cyanosis  Skin: warm, dry, no visible rash      Laboratory Studies:  CBC:                       11.1   8.98  )-----------( 559      ( 11 Apr 2023 07:37 )             34.7     CMP: 04-11    143  |  105  |  16  ----------------------------<  92  3.4<L>   |  24  |  1.35<H>    Ca    8.6      11 Apr 2023 07:37            Microbiology: reviewed    Culture - Urine (collected 04-09-23 @ 08:42)  Source: Catheterized Catheterized  Preliminary Report (04-10-23 @ 19:58):    >100,000 CFU/ml Proteus mirabilis          Radiology: reviewed

## 2023-04-11 NOTE — PROGRESS NOTE ADULT - ASSESSMENT
89 yo F with PMH HTN, OA presenting from [facility] with a fall.      #UTI  Rocephin  ID eval noted   Follow cultures    #Fall  X-rays ordered by ED noted  Fall precautions  CT head neg  PT eval    #SAM  Baseline unknown  Likely due to dehydration  Hold Lasix  Nephro eval noted    #HTN  BP running lower  Restart Amlodipine if BP improves    #GOC  Spoke with patients niece, HCP about GOC  Has never thought about it  Palliative care eval for further GOC discussion     #DVT ppx  Renally dosed Lovenox 89 yo F with PMH HTN, OA presenting from [facility] with a fall.    #UTI  UA concerning for LE, nitrites, bacteria and WBC  continue Rocephin QD  U/S unremarkable  ID eval noted   Follow cultures    #Fall  X-rays ordered by ED noted  Fall precautions  CT head neg  PT eval    #SAM  Baseline unknown  Likely due to dehydration  Hold Lasix  Nephro eval noted    #HTN  BP running lower  Restart Amlodipine if BP improves    #GOC  Spoke with patients niece, HCP about GOC  Has never thought about it  Palliative care eval for further GOC discussion     #DVT ppx  Renally dosed Lovenox 89 yo F with PMH HTN, OA presenting from [facility] with a fall.    #UTI  UA concerning for LE, nitrites, bacteria and WBC  continue Rocephin QD  U/S unremarkable, nephro following  ID following  Follow cultures    #Fall  X-rays ordered by ED noted  Fall precautions  CT head neg  PT eval    #SAM  Likely due to dehydration  Hold Lasix  Nephro following    #HTN  BP running lower  Restart Amlodipine if BP improves    #GOC  Spoke with patients niece, HCP about GOC  Has never thought about it  Palliative care eval for further GOC discussion     #DVT ppx  Renally dosed Lovenox

## 2023-04-11 NOTE — GOALS OF CARE CONVERSATION - ADVANCED CARE PLANNING - CONVERSATION DETAILS
Palliative care SW spoke with patient's niece, Ciera,  by phone. Reviewed patient's medical and social history as well as events leading to patient's hospitalization. Writer discussed patient's current diagnosis (UTI, Fall SAM, HX HTN, Dementia, OA), medical condition and management,  prognosis, and life expectancy. Niece stated that she is patient's HCP. SW following up with niethel on GOC discussion. Inquired about patient's wishes regarding extent of medical care to be provided including escalation of medical care into the ICU and use of vasopressor support. In addition, the writer inquired about thoughts regarding cardiopulmonary resuscitation, artificial nutrition and hydration including use of feeding tubes and IVF, antibiotics, and further investigative studies such as blood draws and radiology. Jaida showed some insight into medical condition. She reports that she still does not want to move forward with DNR orders and wants patient to remain a full code. All questions answered. Psychosocial support provided. no

## 2023-04-11 NOTE — PROGRESS NOTE ADULT - TIME BILLING
performing the following activities: Preparing to see the patient, Obtaining and/or reviewing separately obtained history, Performing a medically appropriate examination and/or evaluation, Documenting clinical information in the medical record, Care coordination , Counseling and educating the patient/family/caregiver, Ordering medications, tests, or procedures, Independently interpreting results and communicating results to the patient/family/caregiver , and Referring and communicating with other health care professionals. More than 50% of time spent with direct patient care discussion with patient, review of consult notes

## 2023-04-11 NOTE — PROGRESS NOTE ADULT - ASSESSMENT
The patient is a 89 year old female with a history of HTN, dementia who presents with a fall.    Plan:  - ECG with LBBB; no prior for comparison  - Cardiac enzymes negative  - Renal function improving with IV fluids  - Hold furosemide  - Resume amlodipine at 5 mg daily  - IV antibiotics for possible UTI

## 2023-04-11 NOTE — PROGRESS NOTE ADULT - ASSESSMENT
Patient is a 89 year old fame with PMH of HTN, OA presenting from [facility] with a fall.    Acute UTI  SAM   - UA with pyuria with WBC >50, mod LE, +nitrites, mod bacteria   - follow urine culture -- P. mirabilis, sensitivities pending   - follow renal/bladder ultrasound   - continue on ceftriaxone 1g IV Q24h   - monitor temps/WBC, Cr     Infectious Diseases will continue to follow. Please call with any questions.   Astrid Maharaj M.D.  [facility] Division of Infectious Diseases [phone]

## 2023-04-11 NOTE — PROGRESS NOTE ADULT - SUBJECTIVE AND OBJECTIVE BOX
Patient is a 89y old  Female who presents with a chief complaint of Dizziness  Fall (10 Apr 2023 10:59)      INTERVAL HPI/OVERNIGHT EVENTS:    MEDICATIONS  (STANDING):  amLODIPine   Tablet 5 milliGRAM(s) Oral daily  cefTRIAXone   IVPB 1000 milliGRAM(s) IV Intermittent every 24 hours  dextrose 5% + sodium chloride 0.45%. 1000 milliLiter(s) (50 mL/Hr) IV Continuous <Continuous>  enoxaparin Injectable 30 milliGRAM(s) SubCutaneous every 24 hours    MEDICATIONS  (PRN):  acetaminophen     Tablet .. 650 milliGRAM(s) Oral every 6 hours PRN Temp greater or equal to 38C (100.4F), Mild Pain (1 - 3)  melatonin 3 milliGRAM(s) Oral at bedtime PRN Insomnia      Allergies    No Known Allergies    Intolerances        REVIEW OF SYSTEMS:  CONSTITUTIONAL: No fever, weight loss, or fatigue  EYES: No eye pain, visual disturbances, or discharge  ENMT:  No difficulty hearing, tinnitus, vertigo; No sinus or throat pain  NECK: No pain or stiffness  RESPIRATORY: No cough, wheezing, chills or hemoptysis; No shortness of breath  CARDIOVASCULAR: No chest pain, palpitations, lightheadedness, or leg swelling  GASTROINTESTINAL: No abdominal or epigastric pain. No nausea, vomiting, or hematemesis; No diarrhea or constipation. No melena or hematochezia.  GENITOURINARY: No dysuria, frequency, hematuria, or incontinence  NEUROLOGICAL: No headaches, memory loss, vertigo, loss of strength, numbness, or tremors  SKIN: No itching, burning, rashes, or lesions   LYMPH NODES: No enlarged glands  ENDOCRINE: No heat or cold intolerance; No hair loss; No polydipsia or polyuria  MUSCULOSKELETAL: No joint pain or swelling; No muscle, back, or extremity pain  PSYCHIATRIC: No depression, anxiety, or mood swings  HEME/LYMPH: No easy bruising, or bleeding gums  ALLERGY AND IMMUNOLOGIC: No hives or eczema    PHYSICAL EXAM:  GENERAL: NAD, well-groomed, well-developed  HEAD:  Atraumatic, Normocephalic  EYES: EOMI, PERRLA, conjunctiva and sclera clear  ENMT: Moist mucous membranes, Good dentition, No lesions  NECK: Supple, No JVD appreciated  NERVOUS SYSTEM:  Alert & Oriented X3, Good concentration; All 4 extremities mobile, no gross sensory deficits.   CHEST/LUNG: Clear to auscultation bilaterally; No rales, rhonchi, wheezing, or rubs appreciated  HEART: Regular rate and rhythm; No murmurs, rubs, or gallops  ABDOMEN: Soft, Nontender, Nondistended; Bowel sounds present  EXTREMITIES:  No clubbing, cyanosis, or edema appreciated  LYMPH: No lymphadenopathy noted  SKIN: No rashes or lesions appreciated    Vital Signs Last 24 Hrs  T(C): 37 (11 Apr 2023 14:35), Max: 37 (11 Apr 2023 14:35)  T(F): 98.6 (11 Apr 2023 14:35), Max: 98.6 (11 Apr 2023 14:35)  HR: 85 (11 Apr 2023 14:35) (71 - 95)  BP: 122/75 (11 Apr 2023 14:35) (122/75 - 152/81)  BP(mean): --  RR: 18 (11 Apr 2023 14:35) (16 - 18)  SpO2: 90% (11 Apr 2023 14:35) (90% - 95%)    Parameters below as of 11 Apr 2023 14:35  Patient On (Oxygen Delivery Method): room air        LABS:                        11.1   8.98  )-----------( 559      ( 11 Apr 2023 07:37 )             34.7     11 Apr 2023 07:37    143    |  105    |  16     ----------------------------<  92     3.4     |  24     |  1.35     Ca    8.6        11 Apr 2023 07:37          CAPILLARY BLOOD GLUCOSE          RADIOLOGY & ADDITIONAL TESTS:    Imaging Personally Reviewed:  [ ] YES     Consultant(s) Notes Reviewed:      Care Discussed with Consultants/Other Providers:    Advanced Directives: [ ] DNR  [ ] No feeding tube  [ ] MOLST in chart  [ ] MOLST completed today  [ ] Unknown   Patient is a 89y old  Female who presents with a chief complaint of Dizziness  Fall (10 Apr 2023 10:59)      INTERVAL HPI/OVERNIGHT EVENTS:  Patient seen awake in bed. She reports feeling well, no complaints at this time. No reported overnight events.     MEDICATIONS  (STANDING):  amLODIPine   Tablet 5 milliGRAM(s) Oral daily  cefTRIAXone   IVPB 1000 milliGRAM(s) IV Intermittent every 24 hours  dextrose 5% + sodium chloride 0.45%. 1000 milliLiter(s) (50 mL/Hr) IV Continuous <Continuous>  enoxaparin Injectable 30 milliGRAM(s) SubCutaneous every 24 hours    MEDICATIONS  (PRN):  acetaminophen     Tablet .. 650 milliGRAM(s) Oral every 6 hours PRN Temp greater or equal to 38C (100.4F), Mild Pain (1 - 3)  melatonin 3 milliGRAM(s) Oral at bedtime PRN Insomnia      Allergies    No Known Allergies    Intolerances        REVIEW OF SYSTEMS:  CONSTITUTIONAL: No fever, weight loss, or fatigue  EYES: No eye pain, visual disturbances, or discharge  ENMT:  No difficulty hearing, tinnitus, vertigo; No sinus or throat pain  NECK: No pain or stiffness  RESPIRATORY: No cough, wheezing, chills or hemoptysis; No shortness of breath  CARDIOVASCULAR: No chest pain, palpitations, lightheadedness, or leg swelling  GASTROINTESTINAL: No abdominal or epigastric pain. No nausea, vomiting, or hematemesis; No diarrhea or constipation. No melena or hematochezia.  GENITOURINARY: No dysuria, frequency, hematuria, or incontinence  NEUROLOGICAL: No headaches, memory loss, vertigo, loss of strength, numbness, or tremors  SKIN: No itching, burning, rashes, or lesions   LYMPH NODES: No enlarged glands  ENDOCRINE: No heat or cold intolerance; No hair loss; No polydipsia or polyuria  MUSCULOSKELETAL: No joint pain or swelling; No muscle, back, or extremity pain  PSYCHIATRIC: No depression, anxiety, or mood swings      PHYSICAL EXAM:  GENERAL: NAD, well-groomed, well-developed  HEAD:  Atraumatic, Normocephalic  EYES: EOMI, PERRLA, conjunctiva and sclera clear  ENMT: Moist mucous membranes, Good dentition, No lesions  NECK: Supple, No JVD appreciated  NERVOUS SYSTEM:  Alert & Oriented, Good concentration; All 4 extremities mobile, no gross sensory deficits.   CHEST/LUNG: No increased work of breathing, no wheezing appreciated  HEART: No limb edema appreciated  ABDOMEN: Soft, Nontender, Nondistended  EXTREMITIES:  No clubbing, cyanosis, or edema appreciated  LYMPH: No lymphadenopathy noted  SKIN: No rashes or lesions appreciated    Vital Signs Last 24 Hrs  T(C): 37 (11 Apr 2023 14:35), Max: 37 (11 Apr 2023 14:35)  T(F): 98.6 (11 Apr 2023 14:35), Max: 98.6 (11 Apr 2023 14:35)  HR: 85 (11 Apr 2023 14:35) (71 - 95)  BP: 122/75 (11 Apr 2023 14:35) (122/75 - 152/81)  BP(mean): --  RR: 18 (11 Apr 2023 14:35) (16 - 18)  SpO2: 90% (11 Apr 2023 14:35) (90% - 95%)    Parameters below as of 11 Apr 2023 14:35  Patient On (Oxygen Delivery Method): room air        LABS:                        11.1   8.98  )-----------( 559      ( 11 Apr 2023 07:37 )             34.7     11 Apr 2023 07:37    143    |  105    |  16     ----------------------------<  92     3.4     |  24     |  1.35     Ca    8.6        11 Apr 2023 07:37          CAPILLARY BLOOD GLUCOSE

## 2023-04-11 NOTE — PROGRESS NOTE ADULT - SUBJECTIVE AND OBJECTIVE BOX
Chief Complaint: Fall    Interval Events: No events overnight.    Review of Systems:  General: No fevers, chills, weight gain  Skin: No rashes, color changes  Cardiovascular: No chest pain, orthopnea  Respiratory: No shortness of breath, cough  Gastrointestinal: No nausea, abdominal pain  Genitourinary: No incontinence, pain with urination  Musculoskeletal: No pain, swelling, decreased range of motion  Neurological: No headache, weakness  Psychiatric: No depression, anxiety  Endocrine: No weight gain, increased thirst  All other systems are comprehensively negative.    Physical Exam:  Vital Signs Last 24 Hrs  T(C): 36.6 (11 Apr 2023 05:00), Max: 37 (10 Apr 2023 14:35)  T(F): 97.8 (11 Apr 2023 05:00), Max: 98.6 (10 Apr 2023 14:35)  HR: 95 (11 Apr 2023 05:00) (71 - 95)  BP: 139/93 (11 Apr 2023 05:00) (133/68 - 152/81)  BP(mean): --  RR: 16 (11 Apr 2023 05:00) (16 - 18)  SpO2: 95% (11 Apr 2023 05:00) (91% - 95%)  Parameters below as of 11 Apr 2023 05:00  Patient On (Oxygen Delivery Method): room air  General: NAD  HEENT: MMM  Neck: No JVD, no carotid bruit  Lungs: CTAB  CV: RRR, nl S1/S2, no M/R/G  Abdomen: S/NT/ND, +BS  Extremities: No LE edema, no cyanosis  Neuro: AAOx3, non-focal  Skin: No rash    Labs:             04-11    143  |  105  |  16  ----------------------------<  92  3.4<L>   |  24  |  1.35<H>    Ca    8.6      11 Apr 2023 07:37                          11.1   8.98  )-----------( 559      ( 11 Apr 2023 07:37 )             34.7       ECG/Telemetry: Sinus rhythm

## 2023-04-11 NOTE — PROGRESS NOTE ADULT - ASSESSMENT
SAM: Prerenal azotemia  Hypokalemia  UTI  h/o Hypertension    Improved renal indices. Potassium supplementation. To continue current meds.    Encourage PO intake as tolerated. Will follow electrolytes and renal function trend.

## 2023-04-11 NOTE — PROGRESS NOTE ADULT - SUBJECTIVE AND OBJECTIVE BOX
Patient is a 89y old  Female who presents with a chief complaint of Fall.    HPI: 89y old  Female with h/o HTN presents from assisted living with complaint of pain after a fall.  Patient states she is not sure that she fell but EMS reports that they were told she fell.  Patient complains of pain in her left wrist.  Patient also complains of pain in her back which she states is longstanding but is worse today.  Patient also complains of some pain in her left knee.  No headache.  But patient states she feels generally unwell and is dizzy.  Unsure if she hit her head.  No report of fever or vomiting.  Patient denies shortness of breath.  No signs of head or facial injury.  No lateralized weakness.  No seizure reported.  No tongue bite.    Interval history -     No new complaints    MEDICATIONS  (STANDING):    amLODIPine   Tablet 5 milliGRAM(s) Oral daily  cefTRIAXone   IVPB 1000 milliGRAM(s) IV Intermittent every 24 hours  dextrose 5% + sodium chloride 0.45%. 1000 milliLiter(s) (50 mL/Hr) IV Continuous <Continuous>  enoxaparin Injectable 30 milliGRAM(s) SubCutaneous every 24 hours    MEDICATIONS  (PRN):  acetaminophen     Tablet .. 650 milliGRAM(s) Oral every 6 hours PRN Temp greater or equal to 38C (100.4F), Mild Pain (1 - 3)  melatonin 3 milliGRAM(s) Oral at bedtime PRN Insomnia    Allergies    No Known Allergies    REVIEW OF SYSTEMS: Poor cognition.    Vital Signs Last 24 Hrs    T(C): 37 (11 Apr 2023 14:35), Max: 37 (11 Apr 2023 14:35)  T(F): 98.6 (11 Apr 2023 14:35), Max: 98.6 (11 Apr 2023 14:35)  HR: 85 (11 Apr 2023 14:35) (71 - 95)  BP: 122/75 (11 Apr 2023 14:35) (122/75 - 152/81)  BP(mean): --  RR: 18 (11 Apr 2023 14:35) (16 - 18)  SpO2: 90% (11 Apr 2023 14:35) (90% - 95%)    Parameters below as of 11 Apr 2023 14:35  Patient On (Oxygen Delivery Method): room air    GENERAL: NAD, well-groomed, well-developed  HEAD:  Atraumatic, Normocephalic  EYES: EOMI, PERRLA, conjunctiva and sclera clear  NECK: Supple, No JVD,     On Neurological Examination:    Mental Status - Pt is alert, awake, Poor cognition. Follows commands.    Speech -  Normal. Pt has no aphasia.    Cranial Nerves - Pupils 3 mm equal and reactive to light, extraocular eye movements intact. No facial asymmetry. Tongue - is in midline.    Motor Exam - C/o left wrist pain and also left hip area pain. moves right sided extremities well.    Sensory Exam -  Pt withdraws all extremities equally on stimulation.    Gait - Couldn't be tested currently.    Deep tendon Reflexes - 2 plus all over.    Neck Supple -  Yes.    LABS:             CBC Full  -  ( 11 Apr 2023 07:37 )  WBC Count : 8.98 K/uL  RBC Count : 4.23 M/uL  Hemoglobin : 11.1 g/dL  Hematocrit : 34.7 %  Platelet Count - Automated : 559 K/uL  Mean Cell Volume : 82.0 fl  Mean Cell Hemoglobin : 26.2 pg  Mean Cell Hemoglobin Concentration : 32.0 gm/dL    04-11    143  |  105  |  16  ----------------------------<  92  3.4<L>   |  24  |  1.35<H>    Ca    8.6      11 Apr 2023 07:37    RADIOLOGY & ADDITIONAL STUDIES:    < from: Xray Knee 3 Views, Left (04.09.23 @ 07:11) >    INTERPRETATION:  AP pelvis, 3 views of the lumbar spine, 2 views of the   thoracic spine and 3 views of the left knee. 3 views of left wrist.    CLINICAL INDICATION: Low back, pelvic and left knee pain. Left wrist pain.    IMPRESSION: There is moderate severe multilevel mid and lower thoracic as   well as lumbar degenerative disease with disc space narrowing and   osteophyte formation. There chronic appearing compression deformities at   T12 and L1. There is no definitive acute fracture. Grade 1   anterolisthesis is noted at L4-L5 with associated facet arthrosis.    There is no hip or pelvic fracture identified. Mild bilateral hip   osteoarthrosis present.    There is moderate first CMC joint arthrosis. Mild degenerative changes   are noted at the interphalangeal joints. There is no wrist fracture.    There is moderate tricompartmental arthrosis of the left knee. There is a   small knee joint effusion. Vascular calcification is present.    < end of copied text >    Multiple X rays are done - Reports to follow.    < from: CT Head No Cont (04.09.23 @ 07:13) >    No evidence of acute intracranial hemorrhage, midline shift or CT evidence of acute territorial infarct.    < end of copied text >

## 2023-04-12 LAB
ANION GAP SERPL CALC-SCNC: 10 MMOL/L — SIGNIFICANT CHANGE UP (ref 5–17)
BUN SERPL-MCNC: 17 MG/DL — SIGNIFICANT CHANGE UP (ref 7–23)
CALCIUM SERPL-MCNC: 8.9 MG/DL — SIGNIFICANT CHANGE UP (ref 8.4–10.5)
CHLORIDE SERPL-SCNC: 106 MMOL/L — SIGNIFICANT CHANGE UP (ref 96–108)
CO2 SERPL-SCNC: 27 MMOL/L — SIGNIFICANT CHANGE UP (ref 22–31)
CREAT SERPL-MCNC: 1.37 MG/DL — HIGH (ref 0.5–1.3)
EGFR: 35 ML/MIN/1.73M2 — LOW
GLUCOSE SERPL-MCNC: 120 MG/DL — HIGH (ref 70–99)
POTASSIUM SERPL-MCNC: 3.8 MMOL/L — SIGNIFICANT CHANGE UP (ref 3.5–5.3)
POTASSIUM SERPL-SCNC: 3.8 MMOL/L — SIGNIFICANT CHANGE UP (ref 3.5–5.3)
SODIUM SERPL-SCNC: 143 MMOL/L — SIGNIFICANT CHANGE UP (ref 135–145)

## 2023-04-12 PROCEDURE — 99232 SBSQ HOSP IP/OBS MODERATE 35: CPT

## 2023-04-12 RX ORDER — AMLODIPINE BESYLATE 2.5 MG/1
10 TABLET ORAL DAILY
Refills: 0 | Status: DISCONTINUED | OUTPATIENT
Start: 2023-04-13 | End: 2023-04-14

## 2023-04-12 RX ORDER — ERTAPENEM SODIUM 1 G/1
500 INJECTION, POWDER, LYOPHILIZED, FOR SOLUTION INTRAMUSCULAR; INTRAVENOUS ONCE
Refills: 0 | Status: COMPLETED | OUTPATIENT
Start: 2023-04-12 | End: 2023-04-12

## 2023-04-12 RX ORDER — ERTAPENEM SODIUM 1 G/1
INJECTION, POWDER, LYOPHILIZED, FOR SOLUTION INTRAMUSCULAR; INTRAVENOUS
Refills: 0 | Status: COMPLETED | OUTPATIENT
Start: 2023-04-12 | End: 2023-04-15

## 2023-04-12 RX ORDER — ERTAPENEM SODIUM 1 G/1
500 INJECTION, POWDER, LYOPHILIZED, FOR SOLUTION INTRAMUSCULAR; INTRAVENOUS EVERY 24 HOURS
Refills: 0 | Status: COMPLETED | OUTPATIENT
Start: 2023-04-13 | End: 2023-04-14

## 2023-04-12 RX ADMIN — SODIUM CHLORIDE 50 MILLILITER(S): 9 INJECTION, SOLUTION INTRAVENOUS at 05:09

## 2023-04-12 RX ADMIN — ENOXAPARIN SODIUM 30 MILLIGRAM(S): 100 INJECTION SUBCUTANEOUS at 10:40

## 2023-04-12 RX ADMIN — ERTAPENEM SODIUM 100 MILLIGRAM(S): 1 INJECTION, POWDER, LYOPHILIZED, FOR SOLUTION INTRAMUSCULAR; INTRAVENOUS at 10:40

## 2023-04-12 RX ADMIN — AMLODIPINE BESYLATE 5 MILLIGRAM(S): 2.5 TABLET ORAL at 05:09

## 2023-04-12 NOTE — PROGRESS NOTE ADULT - ASSESSMENT
89 yo F with PMH HTN, OA presenting from [facility] with a fall.    #UTI  UA concerning for LE, nitrites, bacteria and WBC  urine cx grew proteus  As per ID, switch from Rocephin to Ertapenem for 3 day course until 4/14  U/S unremarkable, nephro following    #Fall  X-rays ordered by ED negative for acute fractures although deformities of T12 and L1  Fall precautions  CT head neg  PT eval    #SAM  Likely due to dehydration  Hold Lasix  Nephro following    #HTN  BP not at goal  Increase home amlodipine to 10mg QD    #GOC  Spoke with patients niece, HCP about GOC  Has never thought about it  Palliative care eval for further GOC discussion     #DVT ppx  Renally dosed Lovenox

## 2023-04-12 NOTE — SOCIAL WORK PROGRESS NOTE - NSSWPROGRESSNOTE_GEN_ALL_CORE
sw contacted by pts ave [facility] [phone] regarding dc planning and [facility] options. as requested, LINSEY faxed this am to [facility] in [place] for review. sw to continue to follow.

## 2023-04-12 NOTE — PROGRESS NOTE ADULT - ASSESSMENT
89y old  Female with h/o HTN presents from assisted living Came to ED s/p Fall.  No C/o Lightheadedness Vertigo.  C/o left wrist pain and also left hip area pain.   Left wrist splint in place.  Multiple X rays are done - No acute Fx.   CT Head - No acute pathology.  No signs of CVA.  Pain meds.  Continue PT   Fall/safety precautions.   D/w pt  Would follow.

## 2023-04-12 NOTE — PROGRESS NOTE ADULT - SUBJECTIVE AND OBJECTIVE BOX
Anastasia, Division of Infectious Diseases  [facility]. [facility], [facility], [facility]  [phone]    Name: CONNIE SHARMA  Age: 89y  Gender: Female  MRN: [id number]    Interval History:  Patient seen and examined at bedside this morning  No acute overnight events. Afebrile  Wants to go home  Notes reviewed    Antibiotics:  ertapenem  IVPB      ertapenem  IVPB 500 milliGRAM(s) IV Intermittent once      Medications:  acetaminophen     Tablet .. 650 milliGRAM(s) Oral every 6 hours PRN  amLODIPine   Tablet 5 milliGRAM(s) Oral daily  dextrose 5% + sodium chloride 0.45%. 1000 milliLiter(s) IV Continuous <Continuous>  enoxaparin Injectable 30 milliGRAM(s) SubCutaneous every 24 hours  ertapenem  IVPB      ertapenem  IVPB 500 milliGRAM(s) IV Intermittent once  melatonin 3 milliGRAM(s) Oral at bedtime PRN      Review of Systems:  unable to obtain    Allergies: No Known Allergies    For details regarding the patient's past medical history, social history, family history, and other miscellaneous elements, please refer the initial infectious diseases consultation and/or the admitting history and physical examination for this admission.    Objective:  Vitals:   T(C): 36.7 (04-12-23 @ 05:14), Max: 37.1 (04-11-23 @ 21:47)  HR: 73 (04-12-23 @ 05:14) (73 - 86)  BP: 157/84 (04-12-23 @ 05:14) (122/75 - 157/84)  RR: 18 (04-12-23 @ 05:14) (18 - 20)  SpO2: 90% (04-12-23 @ 05:14) (90% - 94%)    Physical Examination:  General: no acute distress  HEENT: NC/AT  Cardio: S1, S2 heard, RRR, no murmurs  Resp: decreased breath sounds  Abd: soft, NT, ND  Ext: no edema or cyanosis  Skin: warm, dry, no visible rash      Laboratory Studies:  CBC:                       11.1   8.98  )-----------( 559      ( 11 Apr 2023 07:37 )             34.7     CMP: 04-12    143  |  106  |  17  ----------------------------<  120<H>  3.8   |  27  |  1.37<H>    Ca    8.9      12 Apr 2023 06:00            Microbiology: reviewed    Culture - Urine (collected 04-09-23 @ 08:42)  Source: Catheterized Catheterized  Final Report (04-11-23 @ 17:06):    >100,000 CFU/ml Proteus mirabilis ESBL  Organism: Proteus mirabilis ESBL (04-11-23 @ 17:06)  Organism: Proteus mirabilis ESBL (04-11-23 @ 17:06)      Method Type: MIRNA      -  Amikacin: S <=16      -  Amoxicillin/Clavulanic Acid: S <=8/4      -  Ampicillin: R >16 These ampicillin results predict results for amoxicillin      -  Ampicillin/Sulbactam: S 8/4 Enterobacter, Klebsiella aerogenes, Citrobacter, and Serratia may develop resistance during prolonged therapy (3-4 days)      -  Aztreonam: R 16      -  Cefazolin: R >16 For uncomplicated UTI with K. pneumoniae, E. coli, or P. mirablis: MIRNA <=16 is sensitive and MIRNA >=32 is resistant. This also predicts results for oral agents cefaclor, cefdinir, cefpodoxime, cefprozil, cefuroxime axetil, cephalexin and locarbef for uncomplicated UTI. Note that some isolates may be susceptible to these agents while testing resistant to cefazolin.      -  Cefepime: R >16      -  Ceftriaxone: R >32 Enterobacter, Klebsiella aerogenes, Citrobacter, and Serratia may develop resistance during prolonged therapy      -  Cefuroxime: R >16      -  Ciprofloxacin: R >2      -  Ertapenem: S <=0.5      -  Gentamicin: S <=2      -  Levofloxacin: R 4      -  Meropenem: S <=1      -  Nitrofurantoin: R >64 Should not be used to treat pyelonephritis      -  Piperacillin/Tazobactam: S <=8      -  Tobramycin: S <=2      -  Trimethoprim/Sulfamethoxazole: R >2/38          Radiology: reviewed    < from: US Renal (04.11.23 @ 10:32) >    ACC: [id number] EXAM:  US KIDNEY(S)   ORDERED BY: CARINA WHITE     PROCEDURE DATE:  04/11/2023          INTERPRETATION:  CLINICAL INFORMATION: UTI.    COMPARISON: None available.    TECHNIQUE: Sonography of the kidneys and bladder.    FINDINGS:  Right kidney: 11.2 cm. Renal parenchymal thinning. Increased echotexture   of the renal parenchyma noted suggesting chronic medical renal disease.   No hydronephrosis. No calculi. 7 mm mid to lower pole cyst.    Left kidney: 6.6 cm. Renal parenchymal thinning. Increased echotexture of   the renal parenchyma noted suggesting chronic medical renal disease. No   hydronephrosis. No calculi. Cystic lesions identified measuring up to 1.3   cm.    Urinary bladder: Bladder volume approximates 250 cc. No bladder wall   thickening. No intraluminal mass. Echogenic debris is noted layering   posteriorly within the bladder lumen.    IMPRESSION:  No bilateral hydronephrosis. Bilateral renal parenchymal thinning and   increased echotexture of the renal parenchyma suggests chronic medical   renal disease. Echogenic debris is noted layering posteriorly within the   bladder lumen.        --- End of Report ---            ABBY CARDENAS MD; Attending Radiologist  This document has been electronically signed. Apr 11 2023 10:49AM    < end of copied text >

## 2023-04-12 NOTE — PROGRESS NOTE ADULT - ASSESSMENT
The patient is a 89 year old female with a history of HTN, dementia who presents with a fall.    Plan:  - ECG with LBBB; no prior for comparison  - Cardiac enzymes negative  - Renal function improving with IV fluids  - Hold furosemide  - Continue amlodipine 5 mg daily  - IV antibiotics for possible UTI

## 2023-04-12 NOTE — PROGRESS NOTE ADULT - ASSESSMENT
Patient is a 89 year old fame with PMH of HTN, OA presenting from [facility] with a fall.    Acute UTI 2/2 ESBL P. mirabilis  SAM   - UA with pyuria with WBC >50, mod LE, +nitrites, mod bacteria   - follow urine culture -- P. mirabilis, ESBL   - renal sono w/o upper  tract disease   - d/c ceftriaxone   - switch to ertapenem 1gm q24h x3 day course until 4/14   - monitor temps/WBC, Cr     Infectious Diseases will continue to follow. Please call with any questions.   Astrid Maharaj M.D.  [facility] Division of Infectious Diseases [phone]

## 2023-04-12 NOTE — PROGRESS NOTE ADULT - SUBJECTIVE AND OBJECTIVE BOX
Patient is a 89y old  Female who presents with a chief complaint of Fall.    HPI: 89y old  Female with h/o HTN presents from assisted living with complaint of pain after a fall.  Patient states she is not sure that she fell but EMS reports that they were told she fell.  Patient complains of pain in her left wrist.  Patient also complains of pain in her back which she states is longstanding but is worse today.  Patient also complains of some pain in her left knee.  No headache.  But patient states she feels generally unwell and is dizzy.  Unsure if she hit her head.  No report of fever or vomiting.  Patient denies shortness of breath.  No signs of head or facial injury.  No lateralized weakness.  No seizure reported.  No tongue bite.    Interval history -     No new complaints    MEDICATIONS  (STANDING):    enoxaparin Injectable 30 milliGRAM(s) SubCutaneous every 24 hours  ertapenem  IVPB        MEDICATIONS  (PRN):    acetaminophen     Tablet .. 650 milliGRAM(s) Oral every 6 hours PRN Temp greater or equal to 38C (100.4F), Mild Pain (1 - 3)  melatonin 3 milliGRAM(s) Oral at bedtime PRN Insomnia    Allergies    No Known Allergies    REVIEW OF SYSTEMS: Poor cognition.    Vital Signs Last 24 Hrs    T(C): 36.9 (04-12-23 @ 14:33), Max: 37.1 (04-11-23 @ 21:47)  T(F): 98.5 (04-12-23 @ 14:33), Max: 98.7 (04-11-23 @ 21:47)  HR: 81 (04-12-23 @ 14:33) (73 - 86)  BP: 146/87 (04-12-23 @ 14:33) (123/78 - 157/84)  BP(mean): --  RR: 18 (04-12-23 @ 14:33) (18 - 20)  SpO2: 92% (04-12-23 @ 14:33) (90% - 94%)    Parameters below as of 11 Apr 2023 14:35  Patient On (Oxygen Delivery Method): room air    GENERAL: NAD, well-groomed, well-developed  HEAD:  Atraumatic, Normocephalic  EYES: EOMI, PERRLA, conjunctiva and sclera clear  NECK: Supple, No JVD,     On Neurological Examination:    Mental Status - Pt is alert, awake, Poor cognition. Follows commands.    Speech -  Normal. Pt has no aphasia.    Cranial Nerves - Pupils 3 mm equal and reactive to light, extraocular eye movements intact. No facial asymmetry. Tongue - is in midline.    Motor Exam - C/o left wrist pain and also left hip area pain. moves right sided extremities well.    Sensory Exam -  Pt withdraws all extremities equally on stimulation.    Gait - Couldn't be tested currently.    Deep tendon Reflexes - 2 plus all over.    Neck Supple -  Yes.    LABS:             CBC Full  -  ( 11 Apr 2023 07:37 )  WBC Count : 8.98 K/uL  RBC Count : 4.23 M/uL  Hemoglobin : 11.1 g/dL  Hematocrit : 34.7 %  Platelet Count - Automated : 559 K/uL  Mean Cell Volume : 82.0 fl  Mean Cell Hemoglobin : 26.2 pg  Mean Cell Hemoglobin Concentration : 32.0 gm/dL    04-11    143  |  105  |  16  ----------------------------<  92  3.4<L>   |  24  |  1.35<H>    Ca    8.6      11 Apr 2023 07:37    RADIOLOGY & ADDITIONAL STUDIES:    < from: Xray Knee 3 Views, Left (04.09.23 @ 07:11) >    INTERPRETATION:  AP pelvis, 3 views of the lumbar spine, 2 views of the   thoracic spine and 3 views of the left knee. 3 views of left wrist.    CLINICAL INDICATION: Low back, pelvic and left knee pain. Left wrist pain.    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 There chronic appearing compression deformities at T12 and L1. There is no definitive acute fracture. Grade 1 anterolisthesis is noted at L4-L5 with associated facet arthrosis.    There is no hip or pelvic fracture identified. Mild bilateral hip   osteoarthrosis present.    There is moderate first CMC joint arthrosis. Mild degenerative changes are noted at the interphalangeal joints. There is no wrist fracture.    There is moderate tricompartmental arthrosis of the left knee. There is a small knee joint effusion. Vascular calcification is present.    < end of copied text >    Multiple X rays are done - Reports to follow.    < from: CT Head No Cont (04.09.23 @ 07:13) >    No evidence of acute intracranial hemorrhage, midline shift or CT evidence of acute territorial infarct.    < end of copied text >

## 2023-04-12 NOTE — PROGRESS NOTE ADULT - SUBJECTIVE AND OBJECTIVE BOX
Chief Complaint: Fall    Interval Events: No events overnight.    Review of Systems:  General: No fevers, chills, weight gain  Skin: No rashes, color changes  Cardiovascular: No chest pain, orthopnea  Respiratory: No shortness of breath, cough  Gastrointestinal: No nausea, abdominal pain  Genitourinary: No incontinence, pain with urination  Musculoskeletal: No pain, swelling, decreased range of motion  Neurological: No headache, weakness  Psychiatric: No depression, anxiety  Endocrine: No weight gain, increased thirst  All other systems are comprehensively negative.    Physical Exam:  Vital Signs Last 24 Hrs  T(C): 36.7 (12 Apr 2023 05:14), Max: 37.1 (11 Apr 2023 21:47)  T(F): 98 (12 Apr 2023 05:14), Max: 98.7 (11 Apr 2023 21:47)  HR: 73 (12 Apr 2023 05:14) (73 - 86)  BP: 157/84 (12 Apr 2023 05:14) (122/75 - 157/84)  BP(mean): --  RR: 18 (12 Apr 2023 05:14) (18 - 20)  SpO2: 90% (12 Apr 2023 05:14) (90% - 94%)  Parameters below as of 12 Apr 2023 01:39  Patient On (Oxygen Delivery Method): room air  General: NAD  HEENT: MMM  Neck: No JVD, no carotid bruit  Lungs: CTAB  CV: RRR, nl S1/S2, no M/R/G  Abdomen: S/NT/ND, +BS  Extremities: No LE edema, no cyanosis  Neuro: AAOx3, non-focal  Skin: No rash    Labs:             04-12    143  |  106  |  17  ----------------------------<  120<H>  3.8   |  27  |  1.37<H>    Ca    8.9      12 Apr 2023 06:00                          11.1   8.98  )-----------( 559      ( 11 Apr 2023 07:37 )             34.7       ECG/Telemetry: Sinus rhythm

## 2023-04-12 NOTE — PROGRESS NOTE ADULT - SUBJECTIVE AND OBJECTIVE BOX
Patient is a 89y old  Female who presents with a chief complaint of Dizziness  Fall (11 Apr 2023 16:50)      INTERVAL HPI/OVERNIGHT EVENTS:  Patient seen awake in bed. She reports feeeling "fine," reports feeling improved from before. Reports sleeping poorly overnight but declines sleep meds. No reported overnight events.       MEDICATIONS  (STANDING):  amLODIPine   Tablet 5 milliGRAM(s) Oral daily  dextrose 5% + sodium chloride 0.45%. 1000 milliLiter(s) (50 mL/Hr) IV Continuous <Continuous>  enoxaparin Injectable 30 milliGRAM(s) SubCutaneous every 24 hours  ertapenem  IVPB        MEDICATIONS  (PRN):  acetaminophen     Tablet .. 650 milliGRAM(s) Oral every 6 hours PRN Temp greater or equal to 38C (100.4F), Mild Pain (1 - 3)  melatonin 3 milliGRAM(s) Oral at bedtime PRN Insomnia      Allergies    No Known Allergies    Intolerances        REVIEW OF SYSTEMS:  CONSTITUTIONAL: No fever, weight loss, or fatigue  EYES: No eye pain, visual disturbances, or discharge  ENMT:  No difficulty hearing, tinnitus, vertigo; No sinus or throat pain  NECK: No pain or stiffness  RESPIRATORY: No cough, wheezing, chills or hemoptysis; No shortness of breath  CARDIOVASCULAR: No chest pain, palpitations, lightheadedness, or leg swelling  GASTROINTESTINAL: No abdominal or epigastric pain. No nausea, vomiting, or hematemesis; No diarrhea or constipation. No melena or hematochezia.  GENITOURINARY: No dysuria, frequency, hematuria, or incontinence  NEUROLOGICAL: No headaches, memory loss, vertigo, loss of strength, numbness, or tremors  SKIN: No itching, burning, rashes, or lesions   LYMPH NODES: No enlarged glands  ENDOCRINE: No heat or cold intolerance; No hair loss; No polydipsia or polyuria  MUSCULOSKELETAL: No joint pain or swelling; No muscle, back, or extremity pain  PSYCHIATRIC: No depression, anxiety, or mood swings      PHYSICAL EXAM:  GENERAL: NAD, well-groomed, well-developed  HEAD:  Atraumatic, Normocephalic  EYES: EOMI, PERRLA, conjunctiva and sclera clear  ENMT: Moist mucous membranes, Good dentition, No lesions  NECK: Supple, No JVD appreciated  NERVOUS SYSTEM:  Alert & Oriented, Good concentration; All 4 extremities mobile, no gross sensory deficits.   CHEST/LUNG: No increased work of breathing, no wheezing appreciated  HEART: No limb edema appreciated  ABDOMEN: Soft, Nontender, Nondistended  EXTREMITIES:  No clubbing, cyanosis, or edema appreciated  LYMPH: No lymphadenopathy noted  SKIN: No rashes or lesions appreciated      Vital Signs Last 24 Hrs  T(C): 36.7 (12 Apr 2023 05:14), Max: 37.1 (11 Apr 2023 21:47)  T(F): 98 (12 Apr 2023 05:14), Max: 98.7 (11 Apr 2023 21:47)  HR: 73 (12 Apr 2023 05:14) (73 - 86)  BP: 157/84 (12 Apr 2023 05:14) (122/75 - 157/84)  BP(mean): --  RR: 18 (12 Apr 2023 05:14) (18 - 20)  SpO2: 90% (12 Apr 2023 05:14) (90% - 94%)    Parameters below as of 12 Apr 2023 01:39  Patient On (Oxygen Delivery Method): room air        LABS:    12 Apr 2023 06:00    143    |  106    |  17     ----------------------------<  120    3.8     |  27     |  1.37     Ca    8.9        12 Apr 2023 06:00          CAPILLARY BLOOD GLUCOSE

## 2023-04-13 LAB — SARS-COV-2 RNA SPEC QL NAA+PROBE: SIGNIFICANT CHANGE UP

## 2023-04-13 PROCEDURE — 99232 SBSQ HOSP IP/OBS MODERATE 35: CPT

## 2023-04-13 RX ADMIN — ENOXAPARIN SODIUM 30 MILLIGRAM(S): 100 INJECTION SUBCUTANEOUS at 10:45

## 2023-04-13 RX ADMIN — Medication 650 MILLIGRAM(S): at 08:37

## 2023-04-13 RX ADMIN — ERTAPENEM SODIUM 100 MILLIGRAM(S): 1 INJECTION, POWDER, LYOPHILIZED, FOR SOLUTION INTRAMUSCULAR; INTRAVENOUS at 08:35

## 2023-04-13 RX ADMIN — Medication 650 MILLIGRAM(S): at 09:15

## 2023-04-13 RX ADMIN — AMLODIPINE BESYLATE 10 MILLIGRAM(S): 2.5 TABLET ORAL at 06:14

## 2023-04-13 NOTE — PROGRESS NOTE ADULT - ASSESSMENT
Patient is a 89 year old fame with PMH of HTN, OA presenting from [facility] with a fall.    Acute UTI 2/2 ESBL P. mirabilis  SAM   - UA with pyuria with WBC >50, mod LE, +nitrites, mod bacteria   - follow urine culture -- P. mirabilis, ESBL   - renal sono w/o upper  tract disease   - d/c ceftriaxone   - switch to ertapenem 1gm q24h x3 day course until 4/14   - monitor temps/WBC, Cr     Infectious Diseases will continue to follow. Please call with any questions.   Astrid Maharaj M.D.  [facility] Division of Infectious Diseases [phone]

## 2023-04-13 NOTE — SOCIAL WORK PROGRESS NOTE - NSSWPROGRESSNOTE_GEN_ALL_CORE
pt accepted at [facility]. [facility] to follow for [facility] planning for [facility] pending authorization.

## 2023-04-13 NOTE — DIETITIAN INITIAL EVALUATION ADULT - PHYSCIAL ASSESSMENT
Reports ht 5'4", -125# (ht per EMR 5'1"), denies significant weight changes  frail, elderly appearance (89yo female), no overt s/s malnutrition/well nourished

## 2023-04-13 NOTE — PROGRESS NOTE ADULT - ASSESSMENT
SAM: Prerenal azotemia  Hypokalemia  UTI  h/o Hypertension    Improved and stable renal indices. PRN Potassium supplementation. To continue current meds.    Encourage PO intake as tolerated. Will follow electrolytes and renal function trend. D/c planning.

## 2023-04-13 NOTE — PROGRESS NOTE ADULT - SUBJECTIVE AND OBJECTIVE BOX
[facility], Division of Infectious Diseases  ANNA Schaefer Y. Patel, S. Shah, G. [facility]  [phone]    Name: CONNIE SHARMA  Age: 89y  Gender: Female  MRN: [id number]    Interval History:  Patient seen and examined at bedside this morning  No acute overnight events. Afebrile  No complaints  Notes reviewed    Antibiotics:  ertapenem  IVPB          Medications:  acetaminophen     Tablet .. 650 milliGRAM(s) Oral every 6 hours PRN  enoxaparin Injectable 30 milliGRAM(s) SubCutaneous every 24 hours  ertapenem  IVPB      melatonin 3 milliGRAM(s) Oral at bedtime PRN      Review of Systems:  Review of systems otherwise negative except as previously noted.    Allergies: No Known Allergies    For details regarding the patient's past medical history, social history, family history, and other miscellaneous elements, please refer the initial infectious diseases consultation and/or the admitting history and physical examination for this admission.    Objective:  Vitals:   T(C): 36.6 (04-13-23 @ 05:00), Max: 36.9 (04-12-23 @ 14:33)  HR: 88 (04-13-23 @ 05:00) (81 - 89)  BP: 141/74 (04-13-23 @ 05:00) (141/74 - 155/83)  RR: 18 (04-13-23 @ 05:00) (18 - 18)  SpO2: 90% (04-13-23 @ 05:00) (90% - 92%)    Physical Examination:  General: no acute distress  HEENT: NC/AT  Cardio: S1, S2 heard, RRR, no murmurs  Resp: decreased breath sounds  Abd: soft, NT, ND  Ext: no edema or cyanosis  Skin: warm, dry, no visible rash        Laboratory Studies:  CBC:   CMP: 04-12    143  |  106  |  17  ----------------------------<  120<H>  3.8   |  27  |  1.37<H>    Ca    8.9      12 Apr 2023 06:00            Microbiology: reviewed    Culture - Urine (collected 04-09-23 @ 08:42)  Source: Catheterized Catheterized  Final Report (04-11-23 @ 17:06):    >100,000 CFU/ml Proteus mirabilis ESBL  Organism: Proteus mirabilis ESBL (04-11-23 @ 17:06)  Organism: Proteus mirabilis ESBL (04-11-23 @ 17:06)      Method Type: MIRNA      -  Amikacin: S <=16      -  Amoxicillin/Clavulanic Acid: S <=8/4      -  Ampicillin: R >16 These ampicillin results predict results for amoxicillin      -  Ampicillin/Sulbactam: S 8/4 Enterobacter, Klebsiella aerogenes, Citrobacter, and Serratia may develop resistance during prolonged therapy (3-4 days)      -  Aztreonam: R 16      -  Cefazolin: R >16 For uncomplicated UTI with K. pneumoniae, E. coli, or P. mirablis: MIRNA <=16 is sensitive and MIRNA >=32 is resistant. This also predicts results for oral agents cefaclor, cefdinir, cefpodoxime, cefprozil, cefuroxime axetil, cephalexin and locarbef for uncomplicated UTI. Note that some isolates may be susceptible to these agents while testing resistant to cefazolin.      -  Cefepime: R >16      -  Ceftriaxone: R >32 Enterobacter, Klebsiella aerogenes, Citrobacter, and Serratia may develop resistance during prolonged therapy      -  Cefuroxime: R >16      -  Ciprofloxacin: R >2      -  Ertapenem: S <=0.5      -  Gentamicin: S <=2      -  Levofloxacin: R 4      -  Meropenem: S <=1      -  Nitrofurantoin: R >64 Should not be used to treat pyelonephritis      -  Piperacillin/Tazobactam: S <=8      -  Tobramycin: S <=2      -  Trimethoprim/Sulfamethoxazole: R >2/38          Radiology: reviewed

## 2023-04-13 NOTE — PROGRESS NOTE ADULT - SUBJECTIVE AND OBJECTIVE BOX
Patient is a 89y old  Female who presents with a chief complaint of Fall.    HPI: 89y old  Female with h/o HTN presents from assisted living with complaint of pain after a fall.  Patient states she is not sure that she fell but EMS reports that they were told she fell.  Patient complains of pain in her left wrist.  Patient also complains of pain in her back which she states is longstanding but is worse today.  Patient also complains of some pain in her left knee.  No headache.  But patient states she feels generally unwell and is dizzy.  Unsure if she hit her head.  No report of fever or vomiting.  Patient denies shortness of breath.  No signs of head or facial injury.  No lateralized weakness.  No seizure reported.  No tongue bite.    Interval history -     No new complaints    MEDICATIONS  (STANDING):    enoxaparin Injectable 30 milliGRAM(s) SubCutaneous every 24 hours  ertapenem  IVPB        MEDICATIONS  (PRN):    acetaminophen     Tablet .. 650 milliGRAM(s) Oral every 6 hours PRN Temp greater or equal to 38C (100.4F), Mild Pain (1 - 3)  melatonin 3 milliGRAM(s) Oral at bedtime PRN Insomnia    Allergies    No Known Allergies    REVIEW OF SYSTEMS: Poor cognition.    Vital Signs Last 24 Hrs    T(C): 36.6 (04-13-23 @ 05:00), Max: 36.9 (04-12-23 @ 14:33)  T(F): 97.8 (04-13-23 @ 05:00), Max: 98.5 (04-12-23 @ 14:33)  HR: 88 (04-13-23 @ 05:00) (81 - 89)  BP: 141/74 (04-13-23 @ 05:00) (141/74 - 155/83)  BP(mean): --  RR: 18 (04-13-23 @ 05:00) (18 - 18)  SpO2: 90% (04-13-23 @ 05:00) (90% - 92%)    Parameters below as of 11 Apr 2023 14:35  Patient On (Oxygen Delivery Method): room air    GENERAL: NAD, well-groomed, well-developed  HEAD:  Atraumatic, Normocephalic  EYES: EOMI, PERRLA, conjunctiva and sclera clear  NECK: Supple, No JVD,     On Neurological Examination:    Mental Status - Pt is alert, awake, Poor cognition. Follows commands.    Speech -  Normal. Pt has no aphasia.    Cranial Nerves - Pupils 3 mm equal and reactive to light, extraocular eye movements intact. No facial asymmetry. Tongue - is in midline.    Motor Exam - C/o left wrist pain and also left hip area pain. moves right sided extremities well.    Sensory Exam -  Pt withdraws all extremities equally on stimulation.    Gait - Couldn't be tested currently.    Deep tendon Reflexes - 2 plus all over.    Neck Supple -  Yes.    LABS:             CBC Full  -  ( 11 Apr 2023 07:37 )  WBC Count : 8.98 K/uL  RBC Count : 4.23 M/uL  Hemoglobin : 11.1 g/dL  Hematocrit : 34.7 %  Platelet Count - Automated : 559 K/uL  Mean Cell Volume : 82.0 fl  Mean Cell Hemoglobin : 26.2 pg  Mean Cell Hemoglobin Concentration : 32.0 gm/dL    04-11    143  |  105  |  16  ----------------------------<  92  3.4<L>   |  24  |  1.35<H>    Ca    8.6      11 Apr 2023 07:37    RADIOLOGY & ADDITIONAL STUDIES:    < from: Xray Knee 3 Views, Left (04.09.23 @ 07:11) >    INTERPRETATION:  AP pelvis, 3 views of the lumbar spine, 2 views of the   thoracic spine and 3 views of the left knee. 3 views of left wrist.    CLINICAL INDICATION: Low back, pelvic and left knee pain. Left wrist pain.    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 There chronic appearing compression deformities at T12 and L1. There is no definitive acute fracture. Grade 1 anterolisthesis is noted at L4-L5 with associated facet arthrosis.    There is no hip or pelvic fracture identified. Mild bilateral hip   osteoarthrosis present.    There is moderate first CMC joint arthrosis. Mild degenerative changes are noted at the interphalangeal joints. There is no wrist fracture.    There is moderate tricompartmental arthrosis of the left knee. There is a small knee joint effusion. Vascular calcification is present.    < end of copied text >    Multiple X rays are done - Reports to follow.    < from: CT Head No Cont (04.09.23 @ 07:13) >    No evidence of acute intracranial hemorrhage, midline shift or CT evidence of acute territorial infarct.    < end of copied text >

## 2023-04-13 NOTE — PROGRESS NOTE ADULT - ASSESSMENT
The patient is a 89 year old female with a history of HTN, dementia who presents with a fall.    Plan:  - ECG with LBBB; no prior for comparison  - Cardiac enzymes negative  - Renal function improved with IV fluids  - Hold furosemide  - Continue amlodipine 5 mg daily  - IV antibiotics for UTI

## 2023-04-13 NOTE — PROGRESS NOTE ADULT - SUBJECTIVE AND OBJECTIVE BOX
Patient is a 89y old  Female who presents with a chief complaint of Urinary tract infection     (13 Apr 2023 13:40)      INTERVAL HPI/OVERNIGHT EVENTS:    MEDICATIONS  (STANDING):  amLODIPine   Tablet 10 milliGRAM(s) Oral daily  enoxaparin Injectable 30 milliGRAM(s) SubCutaneous every 24 hours  ertapenem  IVPB      ertapenem  IVPB 500 milliGRAM(s) IV Intermittent every 24 hours    MEDICATIONS  (PRN):  acetaminophen     Tablet .. 650 milliGRAM(s) Oral every 6 hours PRN Temp greater or equal to 38C (100.4F), Mild Pain (1 - 3)  melatonin 3 milliGRAM(s) Oral at bedtime PRN Insomnia      Allergies    No Known Allergies    Intolerances        REVIEW OF SYSTEMS:  CONSTITUTIONAL: No fever, weight loss, or fatigue  EYES: No eye pain, visual disturbances, or discharge  ENMT:  No difficulty hearing, tinnitus, vertigo; No sinus or throat pain  NECK: No pain or stiffness  RESPIRATORY: No cough, wheezing, chills or hemoptysis; No shortness of breath  CARDIOVASCULAR: No chest pain, palpitations, lightheadedness, or leg swelling  GASTROINTESTINAL: No abdominal or epigastric pain. No nausea, vomiting, or hematemesis; No diarrhea or constipation. No melena or hematochezia.  GENITOURINARY: No dysuria, frequency, hematuria, or incontinence  NEUROLOGICAL: No headaches, memory loss, vertigo, loss of strength, numbness, or tremors  SKIN: No itching, burning, rashes, or lesions   LYMPH NODES: No enlarged glands  ENDOCRINE: No heat or cold intolerance; No hair loss; No polydipsia or polyuria  MUSCULOSKELETAL: No joint pain or swelling; No muscle, back, or extremity pain  PSYCHIATRIC: No depression, anxiety, or mood swings  HEME/LYMPH: No easy bruising, or bleeding gums  ALLERGY AND IMMUNOLOGIC: No hives or eczema    PHYSICAL EXAM:  GENERAL: NAD, well-groomed, well-developed  HEAD:  Atraumatic, Normocephalic  EYES: EOMI, PERRLA, conjunctiva and sclera clear  ENMT: Moist mucous membranes, Good dentition, No lesions  NECK: Supple, No JVD appreciated  NERVOUS SYSTEM:  Alert & Oriented X3, Good concentration; All 4 extremities mobile, no gross sensory deficits.   CHEST/LUNG: Clear to auscultation bilaterally; No rales, rhonchi, wheezing, or rubs appreciated  HEART: Regular rate and rhythm; No murmurs, rubs, or gallops  ABDOMEN: Soft, Nontender, Nondistended; Bowel sounds present  EXTREMITIES:  No clubbing, cyanosis, or edema appreciated  LYMPH: No lymphadenopathy noted  SKIN: No rashes or lesions appreciated    Vital Signs Last 24 Hrs  T(C): 36.7 (13 Apr 2023 13:58), Max: 36.9 (12 Apr 2023 14:33)  T(F): 98 (13 Apr 2023 13:58), Max: 98.5 (12 Apr 2023 14:33)  HR: 75 (13 Apr 2023 13:58) (75 - 89)  BP: 111/66 (13 Apr 2023 13:58) (111/66 - 155/83)  BP(mean): --  RR: 16 (13 Apr 2023 13:58) (16 - 18)  SpO2: 91% (13 Apr 2023 13:58) (90% - 92%)    Parameters below as of 13 Apr 2023 13:58  Patient On (Oxygen Delivery Method): room air        LABS:      Ca    8.9        12 Apr 2023 06:00          CAPILLARY BLOOD GLUCOSE          RADIOLOGY & ADDITIONAL TESTS:    Imaging Personally Reviewed:  [ ] YES     Consultant(s) Notes Reviewed:      Care Discussed with Consultants/Other Providers:    Advanced Directives: [ ] DNR  [ ] No feeding tube  [ ] MOLST in chart  [ ] MOLST completed today  [ ] Unknown   Patient is a 89y old  Female who presents with a chief complaint of Urinary tract infection     (13 Apr 2023 13:40)      INTERVAL HPI/OVERNIGHT EVENTS:  Patient seen awake in bed. She reports sleeping well overnight and denies any complaints at this time. No reported overnight events.     MEDICATIONS  (STANDING):  amLODIPine   Tablet 10 milliGRAM(s) Oral daily  enoxaparin Injectable 30 milliGRAM(s) SubCutaneous every 24 hours  ertapenem  IVPB      ertapenem  IVPB 500 milliGRAM(s) IV Intermittent every 24 hours    MEDICATIONS  (PRN):  acetaminophen     Tablet .. 650 milliGRAM(s) Oral every 6 hours PRN Temp greater or equal to 38C (100.4F), Mild Pain (1 - 3)  melatonin 3 milliGRAM(s) Oral at bedtime PRN Insomnia      Allergies    No Known Allergies    Intolerances      REVIEW OF SYSTEMS:  CONSTITUTIONAL: No fever, weight loss, or fatigue  EYES: No eye pain, visual disturbances, or discharge  ENMT:  No difficulty hearing, tinnitus, vertigo; No sinus or throat pain  NECK: No pain or stiffness  RESPIRATORY: No cough, wheezing, chills or hemoptysis; No shortness of breath  CARDIOVASCULAR: No chest pain, palpitations, lightheadedness, or leg swelling  GASTROINTESTINAL: No abdominal or epigastric pain. No nausea, vomiting, or hematemesis; No diarrhea or constipation. No melena or hematochezia.  GENITOURINARY: No dysuria, frequency, hematuria, or incontinence  NEUROLOGICAL: No headaches, memory loss, vertigo, loss of strength, numbness, or tremors  SKIN: No itching, burning, rashes, or lesions   LYMPH NODES: No enlarged glands  ENDOCRINE: No heat or cold intolerance; No hair loss; No polydipsia or polyuria  MUSCULOSKELETAL: No joint pain or swelling; No muscle, back, or extremity pain      PHYSICAL EXAM:  GENERAL: NAD, well-groomed, well-developed  HEAD:  Atraumatic, Normocephalic  EYES: EOMI, PERRLA, conjunctiva and sclera clear  ENMT: Moist mucous membranes, Good dentition, No lesions  NECK: Supple, No JVD appreciated  NERVOUS SYSTEM:  Alert & Oriented, Good concentration; All 4 extremities mobile, no gross sensory deficits.   CHEST/LUNG: No increased work of breathing, no wheezing appreciated  HEART: No limb edema appreciated  ABDOMEN: Soft, Nontender, Nondistended  EXTREMITIES:  No clubbing, cyanosis, or edema appreciated  LYMPH: No lymphadenopathy noted  SKIN: No rashes or lesions appreciated        Vital Signs Last 24 Hrs  T(C): 36.7 (13 Apr 2023 13:58), Max: 36.9 (12 Apr 2023 14:33)  T(F): 98 (13 Apr 2023 13:58), Max: 98.5 (12 Apr 2023 14:33)  HR: 75 (13 Apr 2023 13:58) (75 - 89)  BP: 111/66 (13 Apr 2023 13:58) (111/66 - 155/83)  BP(mean): --  RR: 16 (13 Apr 2023 13:58) (16 - 18)  SpO2: 91% (13 Apr 2023 13:58) (90% - 92%)    Parameters below as of 13 Apr 2023 13:58  Patient On (Oxygen Delivery Method): room air        LABS:      Ca    8.9        12 Apr 2023 06:00          CAPILLARY BLOOD GLUCOSE          RADIOLOGY & ADDITIONAL TESTS:    Imaging Personally Reviewed:  [ ] YES     Consultant(s) Notes Reviewed:      Care Discussed with Consultants/Other Providers:    Advanced Directives: [ ] DNR  [ ] No feeding tube  [ ] MOLST in chart  [ ] MOLST completed today  [ ] Unknown

## 2023-04-13 NOTE — DIETITIAN INITIAL EVALUATION ADULT - OTHER INFO
Per H&P, pt is a "89 yo F with PMH HTN, OA presenting from [facility] with a fall. Patient is a limited historian due to mental status, collateral from chart review. Called patients niece but was not able to provide additional info. Patient BIBEMS from [facility] for fall. Patient not sure how she fell but currently complains of pain in her left wrist and knee. She currently denies any other sx. Workup in the ED suggestive of UTI. X-rays of wrist, knees, and spine pending."    Pt visited while sitting up in bed eating lunch.  Pt on easy to chew diet throughout admission, observed fairly good intake from lunch tray (consumed 100% protein, most of mashed potato, some soup and drank 4oz milk).  Report good appetite at present time, states she is enjoying food.  Pt admitted from [facility], per pt, has been a resident a few months; expresses some dissatisfaction with meals provided and endorses consuming ~50% of most meals PTA.  Denies significant weight changes.  Skin intact/no pressure ulcers.  PO intake encouraged as tolerated.  Observed partial edentulism/poor dentition secondary to advanced age, denies difficulties chewing/swallowing on easy to chew diet.  Meal selections discussed regularly to optimize po intake and tolerance.  RD to follow up and will continue to monitor pt's nutrition status.

## 2023-04-13 NOTE — PROGRESS NOTE ADULT - SUBJECTIVE AND OBJECTIVE BOX
Chief Complaint: Fall    Interval Events: No events overnight.    Review of Systems:  General: No fevers, chills, weight gain  Skin: No rashes, color changes  Cardiovascular: No chest pain, orthopnea  Respiratory: No shortness of breath, cough  Gastrointestinal: No nausea, abdominal pain  Genitourinary: No incontinence, pain with urination  Musculoskeletal: No pain, swelling, decreased range of motion  Neurological: No headache, weakness  Psychiatric: No depression, anxiety  Endocrine: No weight gain, increased thirst  All other systems are comprehensively negative.    Physical Exam:  Vital Signs Last 24 Hrs  T(C): 36.6 (13 Apr 2023 05:00), Max: 36.9 (12 Apr 2023 14:33)  T(F): 97.8 (13 Apr 2023 05:00), Max: 98.5 (12 Apr 2023 14:33)  HR: 88 (13 Apr 2023 05:00) (81 - 89)  BP: 141/74 (13 Apr 2023 05:00) (141/74 - 155/83)  BP(mean): --  RR: 18 (13 Apr 2023 05:00) (18 - 18)  SpO2: 90% (13 Apr 2023 05:00) (90% - 92%)  Parameters below as of 13 Apr 2023 05:00  Patient On (Oxygen Delivery Method): room air  General: NAD  HEENT: MMM  Neck: No JVD, no carotid bruit  Lungs: CTAB  CV: RRR, nl S1/S2, no M/R/G  Abdomen: S/NT/ND, +BS  Extremities: No LE edema, no cyanosis  Neuro: AAOx3, non-focal  Skin: No rash    Labs:             04-12    143  |  106  |  17  ----------------------------<  120<H>  3.8   |  27  |  1.37<H>    Ca    8.9      12 Apr 2023 06:00        ECG/Telemetry: Sinus rhythm

## 2023-04-13 NOTE — SOCIAL WORK PROGRESS NOTE - NSSWPROGRESSNOTE_GEN_ALL_CORE
spoke with pts benitoamber Cade [phone] this am, aware of no [facility] the [facility]. LINSEY faxed this am to ANTON klein to follow.

## 2023-04-13 NOTE — PROGRESS NOTE ADULT - SUBJECTIVE AND OBJECTIVE BOX
Patient is a 89y old  Female who presents with a chief complaint of Dizziness  Fall (10 Apr 2023 10:59)    Patient seen in follow up for SAM.        PAST MEDICAL HISTORY:  HTN (hypertension)      MEDICATIONS  (STANDING):  enoxaparin Injectable 30 milliGRAM(s) SubCutaneous every 24 hours  ertapenem  IVPB        MEDICATIONS  (PRN):  acetaminophen     Tablet .. 650 milliGRAM(s) Oral every 6 hours PRN Temp greater or equal to 38C (100.4F), Mild Pain (1 - 3)  melatonin 3 milliGRAM(s) Oral at bedtime PRN Insomnia    T(C): 36.6 (04-13-23 @ 05:00), Max: 37.1 (04-11-23 @ 21:47)  HR: 88 (04-13-23 @ 05:00) (73 - 89)  BP: 141/74 (04-13-23 @ 05:00) (122/75 - 157/84)  RR: 18 (04-13-23 @ 05:00)  SpO2: 90% (04-13-23 @ 05:00)  Wt(kg): --  I&O's Detail    12 Apr 2023 07:01  -  13 Apr 2023 07:00  --------------------------------------------------------  IN:  Total IN: 0 mL    OUT:    Voided (mL): 500 mL  Total OUT: 500 mL    Total NET: -500 mL              General: No distress  Respiratory: b/l air entry  Cardiovascular: S1 S2  Gastrointestinal: soft  Extremities:  no edema                          LABORATORY:    04-12    143  |  106  |  17  ----------------------------<  120<H>  3.8   |  27  |  1.37<H>    Ca    8.9      12 Apr 2023 06:00      Sodium, Serum: 143 mmol/L (04-12 @ 06:00)    Potassium, Serum: 3.8 mmol/L (04-12 @ 06:00)    Hemoglobin: 11.1 g/dL (04-11 @ 07:37)    Creatinine, Serum 1.37 (04-12 @ 06:00)  Creatinine, Serum 1.35 (04-11 @ 07:37)

## 2023-04-13 NOTE — DIETITIAN INITIAL EVALUATION ADULT - PERTINENT MEDS FT
MEDICATIONS  (STANDING):  amLODIPine   Tablet 10 milliGRAM(s) Oral daily  enoxaparin Injectable 30 milliGRAM(s) SubCutaneous every 24 hours  ertapenem  IVPB      ertapenem  IVPB 500 milliGRAM(s) IV Intermittent every 24 hours    MEDICATIONS  (PRN):  acetaminophen     Tablet .. 650 milliGRAM(s) Oral every 6 hours PRN Temp greater or equal to 38C (100.4F), Mild Pain (1 - 3)  melatonin 3 milliGRAM(s) Oral at bedtime PRN Insomnia

## 2023-04-13 NOTE — DIETITIAN INITIAL EVALUATION ADULT - PERTINENT LABORATORY DATA
04-12    143  |  106  |  17  ----------------------------<  120<H>  3.8   |  27  |  1.37<H>    Ca    8.9      12 Apr 2023 06:00

## 2023-04-13 NOTE — PROGRESS NOTE ADULT - ASSESSMENT
89y old  Female with h/o HTN presents from assisted living Came to ED s/p Fall.  No C/o Lightheadedness Vertigo.  C/o left wrist pain and also left hip area pain.   Left wrist splint in place.  Multiple X rays are done - No acute Fx.   CT Head - No acute pathology.  No signs of CVA.  Continue Pain meds.  Acute UTI 2/2 ESBL P. mirabilis  On antibiotics.  ID Follows.   Continue PT   Fall/safety precautions.   D/w pt  Would follow.

## 2023-04-14 ENCOUNTER — TRANSCRIPTION ENCOUNTER (OUTPATIENT)
Age: 88
End: 2023-04-14

## 2023-04-14 VITALS
TEMPERATURE: 98 F | SYSTOLIC BLOOD PRESSURE: 114 MMHG | OXYGEN SATURATION: 90 % | DIASTOLIC BLOOD PRESSURE: 66 MMHG | HEART RATE: 74 BPM | RESPIRATION RATE: 18 BRPM

## 2023-04-14 PROCEDURE — 97116 GAIT TRAINING THERAPY: CPT

## 2023-04-14 PROCEDURE — 87186 SC STD MICRODIL/AGAR DIL: CPT

## 2023-04-14 PROCEDURE — 76775 US EXAM ABDO BACK WALL LIM: CPT

## 2023-04-14 PROCEDURE — 72070 X-RAY EXAM THORAC SPINE 2VWS: CPT

## 2023-04-14 PROCEDURE — 96365 THER/PROPH/DIAG IV INF INIT: CPT

## 2023-04-14 PROCEDURE — 72100 X-RAY EXAM L-S SPINE 2/3 VWS: CPT

## 2023-04-14 PROCEDURE — 80053 COMPREHEN METABOLIC PANEL: CPT

## 2023-04-14 PROCEDURE — 36415 COLL VENOUS BLD VENIPUNCTURE: CPT

## 2023-04-14 PROCEDURE — 71045 X-RAY EXAM CHEST 1 VIEW: CPT

## 2023-04-14 PROCEDURE — 82550 ASSAY OF CK (CPK): CPT

## 2023-04-14 PROCEDURE — 87077 CULTURE AEROBIC IDENTIFY: CPT

## 2023-04-14 PROCEDURE — 97161 PT EVAL LOW COMPLEX 20 MIN: CPT

## 2023-04-14 PROCEDURE — 73110 X-RAY EXAM OF WRIST: CPT

## 2023-04-14 PROCEDURE — 70450 CT HEAD/BRAIN W/O DYE: CPT | Mod: MA

## 2023-04-14 PROCEDURE — 97110 THERAPEUTIC EXERCISES: CPT

## 2023-04-14 PROCEDURE — 72170 X-RAY EXAM OF PELVIS: CPT

## 2023-04-14 PROCEDURE — 73562 X-RAY EXAM OF KNEE 3: CPT

## 2023-04-14 PROCEDURE — 80048 BASIC METABOLIC PNL TOTAL CA: CPT

## 2023-04-14 PROCEDURE — 87086 URINE CULTURE/COLONY COUNT: CPT

## 2023-04-14 PROCEDURE — 97530 THERAPEUTIC ACTIVITIES: CPT

## 2023-04-14 PROCEDURE — 99238 HOSP IP/OBS DSCHRG MGMT 30/<: CPT

## 2023-04-14 PROCEDURE — 87635 SARS-COV-2 COVID-19 AMP PRB: CPT

## 2023-04-14 PROCEDURE — 99285 EMERGENCY DEPT VISIT HI MDM: CPT

## 2023-04-14 PROCEDURE — 84484 ASSAY OF TROPONIN QUANT: CPT

## 2023-04-14 PROCEDURE — 81001 URINALYSIS AUTO W/SCOPE: CPT

## 2023-04-14 PROCEDURE — 93005 ELECTROCARDIOGRAM TRACING: CPT

## 2023-04-14 PROCEDURE — 85027 COMPLETE CBC AUTOMATED: CPT

## 2023-04-14 PROCEDURE — 85025 COMPLETE CBC W/AUTO DIFF WBC: CPT

## 2023-04-14 PROCEDURE — 85652 RBC SED RATE AUTOMATED: CPT

## 2023-04-14 RX ORDER — AMLODIPINE BESYLATE 2.5 MG/1
1 TABLET ORAL
Qty: 0 | Refills: 0 | DISCHARGE
Start: 2023-04-14

## 2023-04-14 RX ADMIN — ENOXAPARIN SODIUM 30 MILLIGRAM(S): 100 INJECTION SUBCUTANEOUS at 11:25

## 2023-04-14 RX ADMIN — ERTAPENEM SODIUM 100 MILLIGRAM(S): 1 INJECTION, POWDER, LYOPHILIZED, FOR SOLUTION INTRAMUSCULAR; INTRAVENOUS at 08:11

## 2023-04-14 RX ADMIN — AMLODIPINE BESYLATE 10 MILLIGRAM(S): 2.5 TABLET ORAL at 05:13

## 2023-04-14 RX ADMIN — Medication 650 MILLIGRAM(S): at 08:30

## 2023-04-14 RX ADMIN — Medication 650 MILLIGRAM(S): at 07:59

## 2023-04-14 NOTE — PROGRESS NOTE ADULT - SUBJECTIVE AND OBJECTIVE BOX
Chief Complaint: Fall    Interval Events: No events overnight.    Review of Systems:  General: No fevers, chills, weight gain  Skin: No rashes, color changes  Cardiovascular: No chest pain, orthopnea  Respiratory: No shortness of breath, cough  Gastrointestinal: No nausea, abdominal pain  Genitourinary: No incontinence, pain with urination  Musculoskeletal: No pain, swelling, decreased range of motion  Neurological: No headache, weakness  Psychiatric: No depression, anxiety  Endocrine: No weight gain, increased thirst  All other systems are comprehensively negative.    Physical Exam:  Vital Signs Last 24 Hrs  T(C): 36.4 (14 Apr 2023 05:00), Max: 36.7 (13 Apr 2023 13:58)  T(F): 97.6 (14 Apr 2023 05:00), Max: 98.1 (13 Apr 2023 21:44)  HR: 74 (14 Apr 2023 05:00) (74 - 80)  BP: 137/69 (14 Apr 2023 05:00) (111/66 - 137/69)  BP(mean): --  RR: 16 (14 Apr 2023 05:15) (16 - 18)  SpO2: 94% (14 Apr 2023 05:15) (90% - 94%)  Parameters below as of 14 Apr 2023 05:00  Patient On (Oxygen Delivery Method): room air  General: NAD  HEENT: MMM  Neck: No JVD, no carotid bruit  Lungs: CTAB  CV: RRR, nl S1/S2, no M/R/G  Abdomen: S/NT/ND, +BS  Extremities: No LE edema, no cyanosis  Neuro: AAOx3, non-focal  Skin: No rash    Labs:                 ECG/Telemetry: Sinus rhythm

## 2023-04-14 NOTE — PROGRESS NOTE ADULT - ASSESSMENT
89 yo F with PMH HTN, OA presenting from [facility] with a fall.    #UTI  UA concerning for LE, nitrites, bacteria and WBC  urine cx grew proteus  As per ID, switch from Rocephin to Ertapenem for 3 day course until 4/14  U/S unremarkable, nephro following    #Fall  X-rays ordered by ED negative for acute fractures although deformities of T12 and L1  Fall precautions  CT head neg  PT eval    #SAM  Likely due to dehydration  Hold Lasix  Nephro following    #HTN  BP not at goal  Increase home amlodipine to 10mg QD    #GOC  Spoke with patients niece, HCP about GOC  Has never thought about it  Palliative care eval for further GOC discussion     #DVT ppx  Renally dosed Lovenox 89 yo F with PMH HTN, OA presenting from [facility] with a fall.    #UTI  UA concerning for LE, nitrites, bacteria and WBC  urine cx grew proteus  ID consulted, switched from Rocephin to Ertapenem for 3 day course, completed  U/S unremarkable, nephro following    #Fall  X-rays ordered by ED negative for acute fractures although deformities of T12 and L1  Fall precautions  CT head neg  PT eval    #SAM  Likely due to dehydration, improved and stable  Holding Lasix  Nephro following    #HTN  BP improved but not at goal  Increased home amlodipine to 10mg QD  holding home lasix for now    #GOC  Spoke with patients niece, HCP about GOC  Has never thought about it  Palliative care eval for further GOC discussion     #DVT ppx  Renally dosed Lovenox

## 2023-04-14 NOTE — PROGRESS NOTE ADULT - SUBJECTIVE AND OBJECTIVE BOX
[facility], Division of Infectious Diseases  ANNA Schaefer Y. Patel, S. Shah, G. [facility]  [phone]    Name: CONNIE SHARMA  Age: 89y  Gender: Female  MRN: [id number]    Interval History:  Patient seen and examined at bedside this morning  No acute overnight events. Afebrile  No complaints  Notes reviewed    Antibiotics:      Medications:  acetaminophen     Tablet .. 650 milliGRAM(s) Oral every 6 hours PRN  amLODIPine   Tablet 10 milliGRAM(s) Oral daily  enoxaparin Injectable 30 milliGRAM(s) SubCutaneous every 24 hours  melatonin 3 milliGRAM(s) Oral at bedtime PRN      Review of Systems:  Review of systems otherwise negative except as previously noted.    Allergies: No Known Allergies    For details regarding the patient's past medical history, social history, family history, and other miscellaneous elements, please refer the initial infectious diseases consultation and/or the admitting history and physical examination for this admission.    Objective:  Vitals:   T(C): 36.4 (04-14-23 @ 05:00), Max: 36.7 (04-13-23 @ 13:58)  HR: 74 (04-14-23 @ 05:00) (74 - 80)  BP: 137/69 (04-14-23 @ 05:00) (111/66 - 137/69)  RR: 16 (04-14-23 @ 05:15) (16 - 18)  SpO2: 94% (04-14-23 @ 05:15) (90% - 94%)    Physical Examination:  General: no acute distress  HEENT: NC/AT  Cardio: S1, S2 heard, RRR, no murmurs  Resp: decreased breath sounds  Abd: soft, NT, ND  Ext: no edema or cyanosis  Skin: warm, dry, no visible rash      Laboratory Studies:  CBC:   CMP:             Microbiology: reviewed    Culture - Urine (collected 04-09-23 @ 08:42)  Source: Catheterized Catheterized  Final Report (04-11-23 @ 17:06):    >100,000 CFU/ml Proteus mirabilis ESBL  Organism: Proteus mirabilis ESBL (04-11-23 @ 17:06)  Organism: Proteus mirabilis ESBL (04-11-23 @ 17:06)      Method Type: MIRNA      -  Amikacin: S <=16      -  Amoxicillin/Clavulanic Acid: S <=8/4      -  Ampicillin: R >16 These ampicillin results predict results for amoxicillin      -  Ampicillin/Sulbactam: S 8/4 Enterobacter, Klebsiella aerogenes, Citrobacter, and Serratia may develop resistance during prolonged therapy (3-4 days)      -  Aztreonam: R 16      -  Cefazolin: R >16 For uncomplicated UTI with K. pneumoniae, E. coli, or P. mirablis: MIRNA <=16 is sensitive and MIRNA >=32 is resistant. This also predicts results for oral agents cefaclor, cefdinir, cefpodoxime, cefprozil, cefuroxime axetil, cephalexin and locarbef for uncomplicated UTI. Note that some isolates may be susceptible to these agents while testing resistant to cefazolin.      -  Cefepime: R >16      -  Ceftriaxone: R >32 Enterobacter, Klebsiella aerogenes, Citrobacter, and Serratia may develop resistance during prolonged therapy      -  Cefuroxime: R >16      -  Ciprofloxacin: R >2      -  Ertapenem: S <=0.5      -  Gentamicin: S <=2      -  Levofloxacin: R 4      -  Meropenem: S <=1      -  Nitrofurantoin: R >64 Should not be used to treat pyelonephritis      -  Piperacillin/Tazobactam: S <=8      -  Tobramycin: S <=2      -  Trimethoprim/Sulfamethoxazole: R >2/38          Radiology: reviewed

## 2023-04-14 NOTE — PROGRESS NOTE ADULT - ASSESSMENT
The patient is a 89 year old female with a history of HTN, dementia who presents with a fall.    Plan:  - ECG with LBBB; no prior for comparison  - Cardiac enzymes negative  - Renal function improved with IV fluids  - Hold furosemide  - Continue amlodipine 5 mg daily  - Completed IV antibiotics for UTI

## 2023-04-14 NOTE — PROGRESS NOTE ADULT - SUBJECTIVE AND OBJECTIVE BOX
Patient is a 89y old  Female who presents with a chief complaint of Urinary tract infection     (13 Apr 2023 13:40)      INTERVAL HPI/OVERNIGHT EVENTS:    MEDICATIONS  (STANDING):  amLODIPine   Tablet 10 milliGRAM(s) Oral daily  enoxaparin Injectable 30 milliGRAM(s) SubCutaneous every 24 hours    MEDICATIONS  (PRN):  acetaminophen     Tablet .. 650 milliGRAM(s) Oral every 6 hours PRN Temp greater or equal to 38C (100.4F), Mild Pain (1 - 3)  melatonin 3 milliGRAM(s) Oral at bedtime PRN Insomnia      Allergies    No Known Allergies    Intolerances        REVIEW OF SYSTEMS:  CONSTITUTIONAL: No fever, weight loss, or fatigue  EYES: No eye pain, visual disturbances, or discharge  ENMT:  No difficulty hearing, tinnitus, vertigo; No sinus or throat pain  NECK: No pain or stiffness  RESPIRATORY: No cough, wheezing, chills or hemoptysis; No shortness of breath  CARDIOVASCULAR: No chest pain, palpitations, lightheadedness, or leg swelling  GASTROINTESTINAL: No abdominal or epigastric pain. No nausea, vomiting, or hematemesis; No diarrhea or constipation. No melena or hematochezia.  GENITOURINARY: No dysuria, frequency, hematuria, or incontinence  NEUROLOGICAL: No headaches, memory loss, vertigo, loss of strength, numbness, or tremors  SKIN: No itching, burning, rashes, or lesions   LYMPH NODES: No enlarged glands  ENDOCRINE: No heat or cold intolerance; No hair loss; No polydipsia or polyuria  MUSCULOSKELETAL: No joint pain or swelling; No muscle, back, or extremity pain  PSYCHIATRIC: No depression, anxiety, or mood swings  HEME/LYMPH: No easy bruising, or bleeding gums  ALLERGY AND IMMUNOLOGIC: No hives or eczema    PHYSICAL EXAM:  GENERAL: NAD, well-groomed, well-developed  HEAD:  Atraumatic, Normocephalic  EYES: EOMI, PERRLA, conjunctiva and sclera clear  ENMT: Moist mucous membranes, Good dentition, No lesions  NECK: Supple, No JVD appreciated  NERVOUS SYSTEM:  Alert & Oriented X3, Good concentration; All 4 extremities mobile, no gross sensory deficits.   CHEST/LUNG: Clear to auscultation bilaterally; No rales, rhonchi, wheezing, or rubs appreciated  HEART: Regular rate and rhythm; No murmurs, rubs, or gallops  ABDOMEN: Soft, Nontender, Nondistended; Bowel sounds present  EXTREMITIES:  No clubbing, cyanosis, or edema appreciated  LYMPH: No lymphadenopathy noted  SKIN: No rashes or lesions appreciated    Vital Signs Last 24 Hrs  T(C): 36.4 (14 Apr 2023 05:00), Max: 36.7 (13 Apr 2023 13:58)  T(F): 97.6 (14 Apr 2023 05:00), Max: 98.1 (13 Apr 2023 21:44)  HR: 74 (14 Apr 2023 05:00) (74 - 80)  BP: 137/69 (14 Apr 2023 05:00) (111/66 - 137/69)  BP(mean): --  RR: 16 (14 Apr 2023 05:15) (16 - 18)  SpO2: 94% (14 Apr 2023 05:15) (90% - 94%)    Parameters below as of 14 Apr 2023 05:00  Patient On (Oxygen Delivery Method): room air        LABS:              CAPILLARY BLOOD GLUCOSE          RADIOLOGY & ADDITIONAL TESTS:    Imaging Personally Reviewed:  [ ] YES     Consultant(s) Notes Reviewed:      Care Discussed with Consultants/Other Providers:    Advanced Directives: [ ] DNR  [ ] No feeding tube  [ ] MOLST in chart  [ ] MOLST completed today  [ ] Unknown   Patient is a 89y old  Female who presents with a chief complaint of Urinary tract infection     (13 Apr 2023 13:40)      INTERVAL HPI/OVERNIGHT EVENTS:  Patient seen awake in bed. She reports sleeping well overnight and denies any complaints at this time. No reported overnight events.       MEDICATIONS  (STANDING):  amLODIPine   Tablet 10 milliGRAM(s) Oral daily  enoxaparin Injectable 30 milliGRAM(s) SubCutaneous every 24 hours    MEDICATIONS  (PRN):  acetaminophen     Tablet .. 650 milliGRAM(s) Oral every 6 hours PRN Temp greater or equal to 38C (100.4F), Mild Pain (1 - 3)  melatonin 3 milliGRAM(s) Oral at bedtime PRN Insomnia      Allergies    No Known Allergies    Intolerances          REVIEW OF SYSTEMS:  CONSTITUTIONAL: No fever, weight loss, or fatigue  EYES: No eye pain, visual disturbances, or discharge  ENMT:  No difficulty hearing, tinnitus, vertigo; No sinus or throat pain  NECK: No pain or stiffness  RESPIRATORY: No cough, wheezing, chills or hemoptysis; No shortness of breath  CARDIOVASCULAR: No chest pain, palpitations, lightheadedness, or leg swelling  GASTROINTESTINAL: No abdominal or epigastric pain. No nausea, vomiting, or hematemesis; No diarrhea or constipation. No melena or hematochezia.  GENITOURINARY: No dysuria, frequency, hematuria, or incontinence  NEUROLOGICAL: No headaches, memory loss, vertigo, loss of strength, numbness, or tremors  SKIN: No itching, burning, rashes, or lesions   LYMPH NODES: No enlarged glands  ENDOCRINE: No heat or cold intolerance; No hair loss; No polydipsia or polyuria  MUSCULOSKELETAL: No joint pain or swelling; No muscle, back, or extremity pain      PHYSICAL EXAM:  GENERAL: NAD, well-groomed, well-developed  HEAD:  Atraumatic, Normocephalic  EYES: EOMI, PERRLA, conjunctiva and sclera clear  ENMT: Moist mucous membranes, Good dentition, No lesions  NECK: Supple, No JVD appreciated  NERVOUS SYSTEM:  Alert & Oriented, Good concentration; All 4 extremities mobile, no gross sensory deficits.   CHEST/LUNG: No increased work of breathing, no wheezing appreciated  HEART: No limb edema appreciated  ABDOMEN: Soft, Nontender, Nondistended  EXTREMITIES:  No clubbing, cyanosis, or edema appreciated  LYMPH: No lymphadenopathy noted  SKIN: No rashes or lesions appreciated          Vital Signs Last 24 Hrs  T(C): 36.4 (14 Apr 2023 05:00), Max: 36.7 (13 Apr 2023 13:58)  T(F): 97.6 (14 Apr 2023 05:00), Max: 98.1 (13 Apr 2023 21:44)  HR: 74 (14 Apr 2023 05:00) (74 - 80)  BP: 137/69 (14 Apr 2023 05:00) (111/66 - 137/69)  BP(mean): --  RR: 16 (14 Apr 2023 05:15) (16 - 18)  SpO2: 94% (14 Apr 2023 05:15) (90% - 94%)    Parameters below as of 14 Apr 2023 05:00  Patient On (Oxygen Delivery Method): room air        LABS:              CAPILLARY BLOOD GLUCOSE

## 2023-04-14 NOTE — DISCHARGE NOTE PROVIDER - NSDCCPCAREPLAN_GEN_ALL_CORE_FT
PRINCIPAL DISCHARGE DIAGNOSIS  Diagnosis: Acute UTI  Assessment and Plan of Treatment:       SECONDARY DISCHARGE DIAGNOSES  Diagnosis: SAM (acute kidney injury)  Assessment and Plan of Treatment:     Diagnosis: Fall  Assessment and Plan of Treatment:     Diagnosis: Left wrist sprain  Assessment and Plan of Treatment:

## 2023-04-14 NOTE — PROGRESS NOTE ADULT - SUBJECTIVE AND OBJECTIVE BOX
Patient is a 89y old  Female who presents with a chief complaint of Fall.    HPI: 89y old  Female with h/o HTN presents from assisted living with complaint of pain after a fall.  Patient states she is not sure that she fell but EMS reports that they were told she fell.  Patient complains of pain in her left wrist.  Patient also complains of pain in her back which she states is longstanding but is worse today.  Patient also complains of some pain in her left knee.  No headache.  But patient states she feels generally unwell and is dizzy.  Unsure if she hit her head.  No report of fever or vomiting.  Patient denies shortness of breath.  No signs of head or facial injury.  No lateralized weakness.  No seizure reported.  No tongue bite.    Interval history -     No new complaints    MEDICATIONS  (STANDING):    amLODIPine   Tablet 10 milliGRAM(s) Oral daily  enoxaparin Injectable 30 milliGRAM(s) SubCutaneous every 24 hours    MEDICATIONS  (PRN):    acetaminophen     Tablet .. 650 milliGRAM(s) Oral every 6 hours PRN Temp greater or equal to 38C (100.4F), Mild Pain (1 - 3)  melatonin 3 milliGRAM(s) Oral at bedtime PRN Insomnia    Allergies    No Known Allergies    REVIEW OF SYSTEMS: Poor cognition.    Vital Signs Last 24 Hrs    T(C): 36.3 (14 Apr 2023 14:40), Max: 36.7 (13 Apr 2023 21:44)  T(F): 97.3 (14 Apr 2023 14:40), Max: 98.1 (13 Apr 2023 21:44)  HR: 72 (14 Apr 2023 14:40) (72 - 80)  BP: 159/69 (14 Apr 2023 14:40) (128/68 - 159/69)  BP(mean): --  RR: 18 (14 Apr 2023 14:40) (16 - 18)  SpO2: 91% (14 Apr 2023 14:40) (90% - 94%)    Parameters below as of 14 Apr 2023 14:40  Patient On (Oxygen Delivery Method): room air    GENERAL: NAD, well-groomed, well-developed  HEAD:  Atraumatic, Normocephalic  EYES: EOMI, PERRLA, conjunctiva and sclera clear  NECK: Supple, No JVD,     On Neurological Examination:    Mental Status - Pt is alert, awake, Poor cognition. Follows commands.    Speech -  Normal. Pt has no aphasia.    Cranial Nerves - Pupils 3 mm equal and reactive to light, extraocular eye movements intact. No facial asymmetry. Tongue - is in midline.    Motor Exam - C/o left wrist pain and also left hip area pain. moves right sided extremities well.    Sensory Exam -  Pt withdraws all extremities equally on stimulation.    Deep tendon Reflexes - 2 plus all over.    Neck Supple -  Yes.    LABS:             CBC Full  -  ( 11 Apr 2023 07:37 )  WBC Count : 8.98 K/uL  RBC Count : 4.23 M/uL  Hemoglobin : 11.1 g/dL  Hematocrit : 34.7 %  Platelet Count - Automated : 559 K/uL  Mean Cell Volume : 82.0 fl  Mean Cell Hemoglobin : 26.2 pg  Mean Cell Hemoglobin Concentration : 32.0 gm/dL    04-11    143  |  105  |  16  ----------------------------<  92  3.4<L>   |  24  |  1.35<H>    Ca    8.6      11 Apr 2023 07:37    RADIOLOGY & ADDITIONAL STUDIES:    < from: Xray Knee 3 Views, Left (04.09.23 @ 07:11) >    INTERPRETATION:  AP pelvis, 3 views of the lumbar spine, 2 views of the   thoracic spine and 3 views of the left knee. 3 views of left wrist.    CLINICAL INDICATION: Low back, pelvic and left knee pain. Left wrist pain.    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 There chronic appearing compression deformities at T12 and L1. There is no definitive acute fracture. Grade 1 anterolisthesis is noted at L4-L5 with associated facet arthrosis.    There is no hip or pelvic fracture identified. Mild bilateral hip   osteoarthrosis present.    There is moderate first CMC joint arthrosis. Mild degenerative changes are noted at the interphalangeal joints. There is no wrist fracture.    There is moderate tricompartmental arthrosis of the left knee. There is a small knee joint effusion. Vascular calcification is present.    < end of copied text >    Multiple X rays are done - Reports to follow.    < from: CT Head No Cont (04.09.23 @ 07:13) >    No evidence of acute intracranial hemorrhage, midline shift or CT evidence of acute territorial infarct.    < end of copied text >

## 2023-04-14 NOTE — SOCIAL WORK PROGRESS NOTE - NSSWPROGRESSNOTE_GEN_ALL_CORE
Per Tx team, pt is medically stable for dc. Auth request for [facility] was not received per [facility]. Request re submitted to [facility]. Auth # [id number] from 4/14-4/18 obtained from [facility] [id number]. Pt is scheduled for dc to [facility] at 5pm via navneet. Pt's tequila Vang, Tx team and [facility] notified.

## 2023-04-14 NOTE — PROGRESS NOTE ADULT - PROVIDER SPECIALTY LIST ADULT
Cardiology
Hospitalist
Infectious Disease
Infectious Disease
Nephrology
Neurology
Cardiology
Hospitalist
Infectious Disease
Nephrology
Neurology
Neurology
Infectious Disease
Infectious Disease
Neurology
Hospitalist
Neurology
Hospitalist
Hospitalist

## 2023-04-14 NOTE — DISCHARGE NOTE PROVIDER - NSDCMRMEDTOKEN_GEN_ALL_CORE_FT
amLODIPine 5 mg oral tablet: 1 orally 2 times a day  Colace 100 mg oral capsule: 1 orally once a day  furosemide 40 mg oral tablet: 1 orally once a day  Vitamin D3 50 mcg (2000 intl units) oral tablet: 1 orally once a day   Colace 100 mg oral capsule: 1 orally once a day  Norvasc 10 mg oral tablet: 1 tab(s) orally once a day  Vitamin D3 50 mcg (2000 intl units) oral tablet: 1 orally once a day

## 2023-04-14 NOTE — PROGRESS NOTE ADULT - ASSESSMENT
89y old  Female with h/o HTN presents from assisted living Came to ED s/p Fall.  No C/o Lightheadedness or Vertigo.  C/o left wrist pain and also left hip area pain.   Left wrist splint in place.  Multiple X rays are done - No acute Fx.   CT Head - No acute pathology.  No signs of CVA.  Continue Pain meds.  Acute UTI 2/2 ESBL P. mirabilis  On antibiotics.  ID Follows.   Continue PT   Fall/safety precautions.  Would follow.

## 2023-04-14 NOTE — PROGRESS NOTE ADULT - ASSESSMENT
Patient is a 89 year old fame with PMH of HTN, OA presenting from [facility] with a fall.    Acute UTI 2/2 ESBL P. mirabilis  SAM   - UA with pyuria with WBC >50, mod LE, +nitrites, mod bacteria   - follow urine culture -- P. mirabilis, ESBL   - renal sono w/o upper  tract diseasee   - completed ertapenem 1gm q24h x3 day course   - monitor off Abx   - monitor temps/WBC, Cr     Stable from ID standpoint  D/c planning per primary team    Infectious Diseases will continue to follow. Please call with any questions.   Astrid Maharaj M.D.  [facility] Division of Infectious Diseases [phone]

## 2023-04-14 NOTE — DISCHARGE NOTE PROVIDER - HOSPITAL COURSE
89 yo F with PMH HTN, OA presenting from [facility] with a fall. Patient is a limited historian due to mental status, collateral from chart review. Called patients niece but was not able to provide additional info. Patient BIBEMS from [facility] for fall. Patient not sure how she fell but currently complains of pain in her left wrist and knee. She currently denies any other sx. Workup in the ED suggestive of UTI.       #UTI  UA concerning for LE, nitrites, bacteria and WBC  urine cx grew proteus  ID consulted, switched from Rocephin to Ertapenem for 3 day course, completed  U/S unremarkable, nephro following    #Fall  X-rays ordered by ED negative for acute fractures although deformities of T12 and L1  Fall precautions  CT head neg  PT ordered    #SAM  Likely due to dehydration, improved and stable  Holding Lasix  Nephro following    #HTN  BP improved but not at goal  Increased home amlodipine to 10mg QD  holding home lasix for now    #GOC  Spoke with patients niece, HCP about GOC  Has never thought about it  Palliative care eval for further GOC discussion

## 2023-04-14 NOTE — DISCHARGE NOTE NURSING/CASE MANAGEMENT/SOCIAL WORK - PATIENT PORTAL LINK FT
You can access the FollowMyHealth Patient Portal offered by [facility] by registering at the following website: [email or web address]. By joining [employer]’s FollowMyHealth portal, you will also be able to view your health information using other applications (apps) compatible with our system.

## 2023-04-14 NOTE — DISCHARGE NOTE NURSING/CASE MANAGEMENT/SOCIAL WORK - NSDCPEFALRISK_GEN_ALL_CORE
For information on Fall & Injury Prevention, visit: [email or web address] OR  [email or web address] OR  https://www.cdc.gov/steadi/patient.html

## 2023-05-11 ENCOUNTER — RX RENEWAL (OUTPATIENT)
Age: 88
End: 2023-05-11

## 2023-05-11 RX ORDER — FUROSEMIDE 20 MG/1
20 TABLET ORAL
Qty: 7 | Refills: 0 | Status: ACTIVE | COMMUNITY
Start: 2022-07-22 | End: 1900-01-01

## 2023-05-11 RX ORDER — AMLODIPINE BESYLATE 5 MG/1
5 TABLET ORAL DAILY
Qty: 7 | Refills: 0 | Status: ACTIVE | COMMUNITY
Start: 1900-01-01 | End: 1900-01-01

## 2023-05-15 ENCOUNTER — APPOINTMENT (OUTPATIENT)
Dept: CARDIOLOGY | Facility: CLINIC | Age: 88
End: 2023-05-15

## 2024-02-15 RX ORDER — AMLODIPINE BESYLATE 2.5 MG/1
1 TABLET ORAL
Refills: 0 | DISCHARGE

## 2024-02-15 RX ORDER — DOCUSATE SODIUM 100 MG
1 CAPSULE ORAL
Refills: 0 | DISCHARGE

## 2024-02-15 RX ORDER — CHOLECALCIFEROL (VITAMIN D3) 125 MCG
1 CAPSULE ORAL
Refills: 0 | DISCHARGE

## 2024-02-15 RX ORDER — FUROSEMIDE 40 MG
1 TABLET ORAL
Refills: 0 | DISCHARGE

## 2025-06-17 NOTE — ED ADULT NURSE REASSESSMENT NOTE - NSFALLRSKASSESSTYPE_ED_ALL_ED
Future Appointments   Date Time Provider Department Center   9/15/2025  4:00 PM Virginia Martinez APRN - CNP [facility] E[facility] DEP     LOV 6/16/2025     Routine Reassessment